# Patient Record
Sex: FEMALE | Race: WHITE | Employment: OTHER | URBAN - NONMETROPOLITAN AREA
[De-identification: names, ages, dates, MRNs, and addresses within clinical notes are randomized per-mention and may not be internally consistent; named-entity substitution may affect disease eponyms.]

---

## 2017-02-20 ENCOUNTER — OFFICE VISIT (OUTPATIENT)
Dept: FAMILY MEDICINE CLINIC | Age: 62
End: 2017-02-20

## 2017-02-20 VITALS
BODY MASS INDEX: 30.67 KG/M2 | WEIGHT: 195.4 LBS | HEART RATE: 72 BPM | DIASTOLIC BLOOD PRESSURE: 80 MMHG | SYSTOLIC BLOOD PRESSURE: 138 MMHG | HEIGHT: 67 IN

## 2017-02-20 DIAGNOSIS — E78.1 PURE HYPERGLYCERIDEMIA: Primary | Chronic | ICD-10-CM

## 2017-02-20 DIAGNOSIS — K21.9 GASTROESOPHAGEAL REFLUX DISEASE WITHOUT ESOPHAGITIS: ICD-10-CM

## 2017-02-20 PROCEDURE — G8428 CUR MEDS NOT DOCUMENT: HCPCS | Performed by: FAMILY MEDICINE

## 2017-02-20 PROCEDURE — 99213 OFFICE O/P EST LOW 20 MIN: CPT | Performed by: FAMILY MEDICINE

## 2017-02-20 PROCEDURE — G8484 FLU IMMUNIZE NO ADMIN: HCPCS | Performed by: FAMILY MEDICINE

## 2017-02-20 PROCEDURE — 3014F SCREEN MAMMO DOC REV: CPT | Performed by: FAMILY MEDICINE

## 2017-02-20 PROCEDURE — 3017F COLORECTAL CA SCREEN DOC REV: CPT | Performed by: FAMILY MEDICINE

## 2017-02-20 PROCEDURE — G8417 CALC BMI ABV UP PARAM F/U: HCPCS | Performed by: FAMILY MEDICINE

## 2017-02-20 PROCEDURE — 1036F TOBACCO NON-USER: CPT | Performed by: FAMILY MEDICINE

## 2017-02-20 RX ORDER — PANTOPRAZOLE SODIUM 40 MG/1
40 TABLET, DELAYED RELEASE ORAL DAILY
Qty: 90 TABLET | Refills: 1 | Status: SHIPPED | OUTPATIENT
Start: 2017-02-20 | End: 2017-08-21 | Stop reason: SDUPTHER

## 2017-02-20 RX ORDER — FENOFIBRATE 145 MG/1
145 TABLET, COATED ORAL DAILY
Qty: 90 TABLET | Refills: 1 | Status: SHIPPED | OUTPATIENT
Start: 2017-02-20 | End: 2017-08-21 | Stop reason: SDUPTHER

## 2017-02-20 RX ORDER — B-COMPLEX WITH VITAMIN C
1 TABLET ORAL DAILY
COMMUNITY
End: 2018-02-19 | Stop reason: DRUGHIGH

## 2017-02-20 RX ORDER — SULFAMETHOXAZOLE AND TRIMETHOPRIM 800; 160 MG/1; MG/1
1 TABLET ORAL 2 TIMES DAILY
Qty: 10 TABLET | Refills: 0 | Status: SHIPPED | OUTPATIENT
Start: 2017-02-20 | End: 2017-02-25

## 2017-02-20 ASSESSMENT — ENCOUNTER SYMPTOMS
RESPIRATORY NEGATIVE: 1
GASTROINTESTINAL NEGATIVE: 1
SHORTNESS OF BREATH: 0

## 2017-04-06 ENCOUNTER — TELEPHONE (OUTPATIENT)
Dept: FAMILY MEDICINE CLINIC | Age: 62
End: 2017-04-06

## 2017-04-06 DIAGNOSIS — R10.11 RIGHT UPPER QUADRANT ABDOMINAL PAIN: Primary | ICD-10-CM

## 2017-04-07 ENCOUNTER — TELEPHONE (OUTPATIENT)
Dept: FAMILY MEDICINE CLINIC | Age: 62
End: 2017-04-07

## 2017-04-10 ENCOUNTER — TELEPHONE (OUTPATIENT)
Dept: FAMILY MEDICINE CLINIC | Age: 62
End: 2017-04-10

## 2017-04-10 DIAGNOSIS — K80.20 GALLSTONES: Primary | ICD-10-CM

## 2017-04-20 ENCOUNTER — TELEPHONE (OUTPATIENT)
Age: 62
End: 2017-04-20

## 2017-04-20 ENCOUNTER — OFFICE VISIT (OUTPATIENT)
Age: 62
End: 2017-04-20

## 2017-04-20 VITALS
HEART RATE: 71 BPM | DIASTOLIC BLOOD PRESSURE: 78 MMHG | SYSTOLIC BLOOD PRESSURE: 132 MMHG | RESPIRATION RATE: 16 BRPM | TEMPERATURE: 99.4 F | BODY MASS INDEX: 28.34 KG/M2 | WEIGHT: 187 LBS | HEIGHT: 68 IN

## 2017-04-20 DIAGNOSIS — Z01.818 PRE-OP TESTING: ICD-10-CM

## 2017-04-20 DIAGNOSIS — K80.20 GALLSTONES: Primary | ICD-10-CM

## 2017-04-20 PROCEDURE — 1036F TOBACCO NON-USER: CPT | Performed by: SURGERY

## 2017-04-20 PROCEDURE — G8427 DOCREV CUR MEDS BY ELIG CLIN: HCPCS | Performed by: SURGERY

## 2017-04-20 PROCEDURE — 3014F SCREEN MAMMO DOC REV: CPT | Performed by: SURGERY

## 2017-04-20 PROCEDURE — 99204 OFFICE O/P NEW MOD 45 MIN: CPT | Performed by: SURGERY

## 2017-04-20 PROCEDURE — G8417 CALC BMI ABV UP PARAM F/U: HCPCS | Performed by: SURGERY

## 2017-04-20 PROCEDURE — 3017F COLORECTAL CA SCREEN DOC REV: CPT | Performed by: SURGERY

## 2017-04-20 ASSESSMENT — ENCOUNTER SYMPTOMS
ABDOMINAL PAIN: 1
ABDOMINAL DISTENTION: 0
CONSTIPATION: 1
CHEST TIGHTNESS: 0
APNEA: 0
ANAL BLEEDING: 0
BLOOD IN STOOL: 0
EYE DISCHARGE: 0
COLOR CHANGE: 0
WHEEZING: 0
COUGH: 1
RHINORRHEA: 0
CHOKING: 0
EYE PAIN: 0
SORE THROAT: 0
SINUS PRESSURE: 0
STRIDOR: 0
BACK PAIN: 1
EYE ITCHING: 0
NAUSEA: 0
DIARRHEA: 0
PHOTOPHOBIA: 0
RECTAL PAIN: 0
FACIAL SWELLING: 0
VOICE CHANGE: 0
TROUBLE SWALLOWING: 0
SHORTNESS OF BREATH: 0
VOMITING: 0
EYE REDNESS: 0

## 2017-05-15 ENCOUNTER — TELEPHONE (OUTPATIENT)
Age: 62
End: 2017-05-15

## 2017-05-23 ENCOUNTER — OFFICE VISIT (OUTPATIENT)
Age: 62
End: 2017-05-23

## 2017-05-23 VITALS
DIASTOLIC BLOOD PRESSURE: 72 MMHG | HEIGHT: 67 IN | SYSTOLIC BLOOD PRESSURE: 122 MMHG | TEMPERATURE: 97.6 F | HEART RATE: 58 BPM | WEIGHT: 181.1 LBS | OXYGEN SATURATION: 96 % | RESPIRATION RATE: 18 BRPM | BODY MASS INDEX: 28.43 KG/M2

## 2017-05-23 DIAGNOSIS — Z90.49 S/P LAPAROSCOPIC CHOLECYSTECTOMY: Primary | ICD-10-CM

## 2017-05-23 PROCEDURE — 99024 POSTOP FOLLOW-UP VISIT: CPT | Performed by: NURSE PRACTITIONER

## 2017-05-23 ASSESSMENT — ENCOUNTER SYMPTOMS
ABDOMINAL DISTENTION: 0
EYE ITCHING: 0
SHORTNESS OF BREATH: 0
BACK PAIN: 0
TROUBLE SWALLOWING: 0
EYE PAIN: 0
RHINORRHEA: 0
CHEST TIGHTNESS: 0
COUGH: 0
EYE DISCHARGE: 0
ABDOMINAL PAIN: 0
BLOOD IN STOOL: 0
NAUSEA: 0
APNEA: 0
RECTAL PAIN: 0
PHOTOPHOBIA: 0
COLOR CHANGE: 0
STRIDOR: 0
DIARRHEA: 0
CONSTIPATION: 0
EYE REDNESS: 0
FACIAL SWELLING: 0
CHOKING: 0
ANAL BLEEDING: 0
SINUS PRESSURE: 0
WHEEZING: 0
VOMITING: 0
SORE THROAT: 0
VOICE CHANGE: 0

## 2017-08-04 ENCOUNTER — OFFICE VISIT (OUTPATIENT)
Dept: FAMILY MEDICINE CLINIC | Age: 62
End: 2017-08-04
Payer: COMMERCIAL

## 2017-08-04 VITALS
SYSTOLIC BLOOD PRESSURE: 122 MMHG | DIASTOLIC BLOOD PRESSURE: 80 MMHG | HEIGHT: 70 IN | BODY MASS INDEX: 25.62 KG/M2 | HEART RATE: 68 BPM | WEIGHT: 179 LBS

## 2017-08-04 DIAGNOSIS — R35.0 URINARY FREQUENCY: ICD-10-CM

## 2017-08-04 DIAGNOSIS — N30.01 ACUTE CYSTITIS WITH HEMATURIA: Primary | ICD-10-CM

## 2017-08-04 LAB
BILIRUBIN, POC: NEGATIVE
BLOOD URINE, POC: NORMAL
CLARITY, POC: NORMAL
COLOR, POC: YELLOW
GLUCOSE URINE, POC: NEGATIVE
KETONES, POC: NEGATIVE
LEUKOCYTE EST, POC: NORMAL
NITRITE, POC: NEGATIVE
PH, POC: 6
PROTEIN, POC: 30
SPECIFIC GRAVITY, POC: 1.02
UROBILINOGEN, POC: 0.2

## 2017-08-04 PROCEDURE — 99213 OFFICE O/P EST LOW 20 MIN: CPT | Performed by: FAMILY MEDICINE

## 2017-08-04 PROCEDURE — 81002 URINALYSIS NONAUTO W/O SCOPE: CPT | Performed by: FAMILY MEDICINE

## 2017-08-04 PROCEDURE — 3014F SCREEN MAMMO DOC REV: CPT | Performed by: FAMILY MEDICINE

## 2017-08-04 PROCEDURE — G8417 CALC BMI ABV UP PARAM F/U: HCPCS | Performed by: FAMILY MEDICINE

## 2017-08-04 PROCEDURE — 3017F COLORECTAL CA SCREEN DOC REV: CPT | Performed by: FAMILY MEDICINE

## 2017-08-04 PROCEDURE — 1036F TOBACCO NON-USER: CPT | Performed by: FAMILY MEDICINE

## 2017-08-04 PROCEDURE — G8427 DOCREV CUR MEDS BY ELIG CLIN: HCPCS | Performed by: FAMILY MEDICINE

## 2017-08-04 RX ORDER — NITROFURANTOIN 25; 75 MG/1; MG/1
100 CAPSULE ORAL 2 TIMES DAILY
Qty: 10 CAPSULE | Refills: 0 | Status: SHIPPED | OUTPATIENT
Start: 2017-08-04 | End: 2017-08-09

## 2017-08-04 ASSESSMENT — ENCOUNTER SYMPTOMS
VOMITING: 0
NAUSEA: 0
CONSTIPATION: 1
DIARRHEA: 0

## 2017-08-21 ENCOUNTER — OFFICE VISIT (OUTPATIENT)
Dept: FAMILY MEDICINE CLINIC | Age: 62
End: 2017-08-21
Payer: COMMERCIAL

## 2017-08-21 VITALS
DIASTOLIC BLOOD PRESSURE: 80 MMHG | BODY MASS INDEX: 26.14 KG/M2 | HEIGHT: 70 IN | WEIGHT: 182.6 LBS | HEART RATE: 64 BPM | SYSTOLIC BLOOD PRESSURE: 124 MMHG

## 2017-08-21 DIAGNOSIS — K21.9 GASTROESOPHAGEAL REFLUX DISEASE WITHOUT ESOPHAGITIS: ICD-10-CM

## 2017-08-21 DIAGNOSIS — E78.1 PURE HYPERGLYCERIDEMIA: Primary | Chronic | ICD-10-CM

## 2017-08-21 PROCEDURE — 3014F SCREEN MAMMO DOC REV: CPT | Performed by: FAMILY MEDICINE

## 2017-08-21 PROCEDURE — 1036F TOBACCO NON-USER: CPT | Performed by: FAMILY MEDICINE

## 2017-08-21 PROCEDURE — 3017F COLORECTAL CA SCREEN DOC REV: CPT | Performed by: FAMILY MEDICINE

## 2017-08-21 PROCEDURE — 99213 OFFICE O/P EST LOW 20 MIN: CPT | Performed by: FAMILY MEDICINE

## 2017-08-21 PROCEDURE — G8427 DOCREV CUR MEDS BY ELIG CLIN: HCPCS | Performed by: FAMILY MEDICINE

## 2017-08-21 PROCEDURE — G8417 CALC BMI ABV UP PARAM F/U: HCPCS | Performed by: FAMILY MEDICINE

## 2017-08-21 RX ORDER — PANTOPRAZOLE SODIUM 40 MG/1
40 TABLET, DELAYED RELEASE ORAL DAILY
Qty: 90 TABLET | Refills: 1 | Status: SHIPPED | OUTPATIENT
Start: 2017-08-21 | End: 2018-02-19 | Stop reason: SDUPTHER

## 2017-08-21 RX ORDER — FENOFIBRATE 145 MG/1
145 TABLET, COATED ORAL DAILY
Qty: 90 TABLET | Refills: 1 | Status: SHIPPED | OUTPATIENT
Start: 2017-08-21 | End: 2018-02-19 | Stop reason: SDUPTHER

## 2017-08-21 RX ORDER — ESTRADIOL 0.1 MG/G
1 CREAM VAGINAL DAILY
Qty: 1 TUBE | Refills: 3 | Status: SHIPPED | OUTPATIENT
Start: 2017-08-21 | End: 2019-02-18

## 2017-08-21 ASSESSMENT — PATIENT HEALTH QUESTIONNAIRE - PHQ9
1. LITTLE INTEREST OR PLEASURE IN DOING THINGS: 0
SUM OF ALL RESPONSES TO PHQ QUESTIONS 1-9: 0
SUM OF ALL RESPONSES TO PHQ9 QUESTIONS 1 & 2: 0
2. FEELING DOWN, DEPRESSED OR HOPELESS: 0

## 2017-08-21 ASSESSMENT — ENCOUNTER SYMPTOMS
SHORTNESS OF BREATH: 0
GASTROINTESTINAL NEGATIVE: 1
RESPIRATORY NEGATIVE: 1

## 2017-11-03 ENCOUNTER — NURSE TRIAGE (OUTPATIENT)
Dept: ADMINISTRATIVE | Age: 62
End: 2017-11-03

## 2017-11-07 ENCOUNTER — OFFICE VISIT (OUTPATIENT)
Dept: FAMILY MEDICINE CLINIC | Age: 62
End: 2017-11-07
Payer: COMMERCIAL

## 2017-11-07 VITALS
HEIGHT: 70 IN | WEIGHT: 187.2 LBS | DIASTOLIC BLOOD PRESSURE: 78 MMHG | BODY MASS INDEX: 26.8 KG/M2 | SYSTOLIC BLOOD PRESSURE: 136 MMHG | HEART RATE: 80 BPM

## 2017-11-07 DIAGNOSIS — N30.00 ACUTE CYSTITIS WITHOUT HEMATURIA: Primary | ICD-10-CM

## 2017-11-07 LAB
BACTERIA URINE, POC: NORMAL
BILIRUBIN URINE: 0 MG/DL
BLOOD, URINE: POSITIVE
CASTS URINE, POC: NORMAL
CLARITY: CLEAR
COLOR: NORMAL
CRYSTALS URINE, POC: NORMAL
EPI CELLS URINE, POC: NORMAL
GLUCOSE URINE: NORMAL
KETONES, URINE: NEGATIVE
LEUKOCYTE EST, POC: NORMAL
NITRITE, URINE: NEGATIVE
PH UA: 7 (ref 4.5–8)
PROTEIN UA: NEGATIVE
RBC URINE, POC: NORMAL
SPECIFIC GRAVITY UA: 1.02 (ref 1–1.03)
UROBILINOGEN, URINE: NORMAL
WBC URINE, POC: NORMAL
YEAST URINE, POC: NORMAL

## 2017-11-07 PROCEDURE — G8484 FLU IMMUNIZE NO ADMIN: HCPCS | Performed by: FAMILY MEDICINE

## 2017-11-07 PROCEDURE — 81000 URINALYSIS NONAUTO W/SCOPE: CPT | Performed by: FAMILY MEDICINE

## 2017-11-07 PROCEDURE — 3014F SCREEN MAMMO DOC REV: CPT | Performed by: FAMILY MEDICINE

## 2017-11-07 PROCEDURE — 3017F COLORECTAL CA SCREEN DOC REV: CPT | Performed by: FAMILY MEDICINE

## 2017-11-07 PROCEDURE — 1036F TOBACCO NON-USER: CPT | Performed by: FAMILY MEDICINE

## 2017-11-07 PROCEDURE — G8427 DOCREV CUR MEDS BY ELIG CLIN: HCPCS | Performed by: FAMILY MEDICINE

## 2017-11-07 PROCEDURE — G8417 CALC BMI ABV UP PARAM F/U: HCPCS | Performed by: FAMILY MEDICINE

## 2017-11-07 PROCEDURE — 99213 OFFICE O/P EST LOW 20 MIN: CPT | Performed by: FAMILY MEDICINE

## 2017-11-07 RX ORDER — SULFAMETHOXAZOLE AND TRIMETHOPRIM 800; 160 MG/1; MG/1
TABLET ORAL
Qty: 60 TABLET | Refills: 0 | Status: SHIPPED | OUTPATIENT
Start: 2017-11-07 | End: 2018-03-09 | Stop reason: ALTCHOICE

## 2017-11-07 NOTE — PROGRESS NOTES
Name:  Edmund Ricci  :    1955    Chief Complaint   Patient presents with    Other     urinary frequency-better this week       HPI:  Long history of bladder issues. Previous repairs and YUKI. Some UTI's. Now with one week of frequency and nocturia. No hematuria. No fever. Gino Mccallum seems to work last visit. Recent lab tests were great. Physical Exam:      /78 (Site: Left Arm, Position: Sitting, Cuff Size: Large Adult)   Pulse 80   Ht 5' 10\" (1.778 m)   Wt 187 lb 3.2 oz (84.9 kg)   BMI 26.86 kg/m²     Not ill. Abd is soft and non tender. Pelvic not indicated. Urine positive for blood and WBC's    Assessment/Plan:      Recurrent UTI    Try suppressive Bactrim    Maykel Vald[place] was seen today for other.     Diagnoses and all orders for this visit:    Acute cystitis without hematuria  -     POC URINE with Microscopic    Other orders  -     sulfamethoxazole-trimethoprim (BACTRIM DS) 800-160 MG per tablet; BID for 7 days then 1/2 tab nightly

## 2017-11-17 ENCOUNTER — OFFICE VISIT (OUTPATIENT)
Dept: FAMILY MEDICINE CLINIC | Age: 62
End: 2017-11-17
Payer: COMMERCIAL

## 2017-11-17 VITALS
DIASTOLIC BLOOD PRESSURE: 60 MMHG | SYSTOLIC BLOOD PRESSURE: 122 MMHG | BODY MASS INDEX: 26.6 KG/M2 | HEIGHT: 70 IN | TEMPERATURE: 98.2 F | HEART RATE: 60 BPM | WEIGHT: 185.8 LBS

## 2017-11-17 DIAGNOSIS — H61.21 IMPACTED CERUMEN OF RIGHT EAR: Primary | ICD-10-CM

## 2017-11-17 PROCEDURE — G8427 DOCREV CUR MEDS BY ELIG CLIN: HCPCS | Performed by: FAMILY MEDICINE

## 2017-11-17 PROCEDURE — 3014F SCREEN MAMMO DOC REV: CPT | Performed by: FAMILY MEDICINE

## 2017-11-17 PROCEDURE — 3017F COLORECTAL CA SCREEN DOC REV: CPT | Performed by: FAMILY MEDICINE

## 2017-11-17 PROCEDURE — G8484 FLU IMMUNIZE NO ADMIN: HCPCS | Performed by: FAMILY MEDICINE

## 2017-11-17 PROCEDURE — 99212 OFFICE O/P EST SF 10 MIN: CPT | Performed by: FAMILY MEDICINE

## 2017-11-17 PROCEDURE — 1036F TOBACCO NON-USER: CPT | Performed by: FAMILY MEDICINE

## 2017-11-17 PROCEDURE — G8417 CALC BMI ABV UP PARAM F/U: HCPCS | Performed by: FAMILY MEDICINE

## 2017-11-17 RX ORDER — HYDROCORTISONE AND ACETIC ACID 20.75; 10.375 MG/ML; MG/ML
3 SOLUTION AURICULAR (OTIC) 2 TIMES DAILY
Qty: 1 BOTTLE | Refills: 2 | Status: SHIPPED | OUTPATIENT
Start: 2017-11-17 | End: 2017-11-27

## 2017-11-17 RX ORDER — MULTIVIT-MIN/IRON/FOLIC ACID/K 18-600-40
1 CAPSULE ORAL DAILY
COMMUNITY

## 2018-02-19 ENCOUNTER — OFFICE VISIT (OUTPATIENT)
Dept: FAMILY MEDICINE CLINIC | Age: 63
End: 2018-02-19
Payer: COMMERCIAL

## 2018-02-19 VITALS
WEIGHT: 193.4 LBS | HEIGHT: 70 IN | DIASTOLIC BLOOD PRESSURE: 82 MMHG | BODY MASS INDEX: 27.69 KG/M2 | SYSTOLIC BLOOD PRESSURE: 130 MMHG | HEART RATE: 68 BPM

## 2018-02-19 DIAGNOSIS — E78.1 PURE HYPERGLYCERIDEMIA: Primary | Chronic | ICD-10-CM

## 2018-02-19 DIAGNOSIS — L63.9 ALOPECIA AREATA: Chronic | ICD-10-CM

## 2018-02-19 DIAGNOSIS — K21.9 GASTROESOPHAGEAL REFLUX DISEASE WITHOUT ESOPHAGITIS: ICD-10-CM

## 2018-02-19 PROCEDURE — 3014F SCREEN MAMMO DOC REV: CPT | Performed by: FAMILY MEDICINE

## 2018-02-19 PROCEDURE — 1036F TOBACCO NON-USER: CPT | Performed by: FAMILY MEDICINE

## 2018-02-19 PROCEDURE — G8417 CALC BMI ABV UP PARAM F/U: HCPCS | Performed by: FAMILY MEDICINE

## 2018-02-19 PROCEDURE — 99213 OFFICE O/P EST LOW 20 MIN: CPT | Performed by: FAMILY MEDICINE

## 2018-02-19 PROCEDURE — G8484 FLU IMMUNIZE NO ADMIN: HCPCS | Performed by: FAMILY MEDICINE

## 2018-02-19 PROCEDURE — G8427 DOCREV CUR MEDS BY ELIG CLIN: HCPCS | Performed by: FAMILY MEDICINE

## 2018-02-19 PROCEDURE — 3017F COLORECTAL CA SCREEN DOC REV: CPT | Performed by: FAMILY MEDICINE

## 2018-02-19 RX ORDER — ZINC GLUCONATE 50 MG
50 TABLET ORAL DAILY
COMMUNITY

## 2018-02-19 RX ORDER — LANOLIN ALCOHOL/MO/W.PET/CERES
1000 CREAM (GRAM) TOPICAL DAILY
COMMUNITY

## 2018-02-19 RX ORDER — DOCUSATE SODIUM 100 MG/1
250 CAPSULE, LIQUID FILLED ORAL DAILY
COMMUNITY
End: 2020-02-25

## 2018-02-19 RX ORDER — PANTOPRAZOLE SODIUM 40 MG/1
40 TABLET, DELAYED RELEASE ORAL DAILY
Qty: 90 TABLET | Refills: 1 | Status: SHIPPED | OUTPATIENT
Start: 2018-02-19 | End: 2018-08-20 | Stop reason: SDUPTHER

## 2018-02-19 RX ORDER — FENOFIBRATE 145 MG/1
145 TABLET, COATED ORAL DAILY
Qty: 90 TABLET | Refills: 1 | Status: SHIPPED | OUTPATIENT
Start: 2018-02-19 | End: 2018-08-20 | Stop reason: SDUPTHER

## 2018-02-19 ASSESSMENT — ENCOUNTER SYMPTOMS
SHORTNESS OF BREATH: 0
GASTROINTESTINAL NEGATIVE: 1
RESPIRATORY NEGATIVE: 1

## 2018-02-19 NOTE — PROGRESS NOTES
pantoprazole (PROTONIX) 40 MG tablet       Plan:      Return in about 6 months (around 8/19/2018). Orders Placed:  No orders of the defined types were placed in this encounter.     Medications Prescribed:  Orders Placed This Encounter   Medications    fenofibrate (TRICOR) 145 MG tablet     Sig: Take 1 tablet by mouth daily     Dispense:  90 tablet     Refill:  1    pantoprazole (PROTONIX) 40 MG tablet     Sig: Take 1 tablet by mouth daily     Dispense:  90 tablet     Refill:  1         Electronically signed by Racquel Mora MD on 2/19/2018 at 12:55 PM

## 2018-03-09 ENCOUNTER — OFFICE VISIT (OUTPATIENT)
Dept: FAMILY MEDICINE CLINIC | Age: 63
End: 2018-03-09
Payer: COMMERCIAL

## 2018-03-09 VITALS
WEIGHT: 192.8 LBS | HEIGHT: 67 IN | TEMPERATURE: 98.7 F | HEART RATE: 68 BPM | SYSTOLIC BLOOD PRESSURE: 128 MMHG | BODY MASS INDEX: 30.26 KG/M2 | DIASTOLIC BLOOD PRESSURE: 68 MMHG

## 2018-03-09 DIAGNOSIS — J01.90 ACUTE SINUSITIS, RECURRENCE NOT SPECIFIED, UNSPECIFIED LOCATION: Primary | ICD-10-CM

## 2018-03-09 PROCEDURE — 1036F TOBACCO NON-USER: CPT | Performed by: FAMILY MEDICINE

## 2018-03-09 PROCEDURE — 3014F SCREEN MAMMO DOC REV: CPT | Performed by: FAMILY MEDICINE

## 2018-03-09 PROCEDURE — 3017F COLORECTAL CA SCREEN DOC REV: CPT | Performed by: FAMILY MEDICINE

## 2018-03-09 PROCEDURE — 99212 OFFICE O/P EST SF 10 MIN: CPT | Performed by: FAMILY MEDICINE

## 2018-03-09 PROCEDURE — G8417 CALC BMI ABV UP PARAM F/U: HCPCS | Performed by: FAMILY MEDICINE

## 2018-03-09 PROCEDURE — G8428 CUR MEDS NOT DOCUMENT: HCPCS | Performed by: FAMILY MEDICINE

## 2018-03-09 PROCEDURE — G8484 FLU IMMUNIZE NO ADMIN: HCPCS | Performed by: FAMILY MEDICINE

## 2018-03-09 RX ORDER — AMOXICILLIN 500 MG/1
500 CAPSULE ORAL 2 TIMES DAILY
Qty: 20 CAPSULE | Refills: 0 | Status: SHIPPED | OUTPATIENT
Start: 2018-03-09 | End: 2018-03-19

## 2018-03-09 NOTE — PROGRESS NOTES
Name:  Aury Bhatt  :    1955    Chief Complaint   Patient presents with    Cough     4 days       HPI:  See recent note. Has at risk grandson with cleft palate. Now she has 3-4 days of progressive congestion and cough. Maybe low grade fever. No SOB. No sore throat. Physical Exam:      /68 (Site: Right Arm, Position: Sitting, Cuff Size: Large Adult)   Pulse 68   Temp 98.7 °F (37.1 °C) (Oral)   Ht 5' 7.25\" (1.708 m)   Wt 192 lb 12.8 oz (87.5 kg)   BMI 29.97 kg/m²     Not ill. Severe congested cough. ENT:   TM's clear. Phar is red. No nodes. Lungs are clear. Heart in RRR with no murmur. Assessment/Plan:      Sinusitis  Amoxil. Halina Snow was seen today for cough. Diagnoses and all orders for this visit:    Acute sinusitis, recurrence not specified, unspecified location  -     amoxicillin (AMOXIL) 500 MG capsule;  Take 1 capsule by mouth 2 times daily for 10 days

## 2018-05-12 LAB
ALBUMIN SERPL-MCNC: 4.3 G/DL
ALP BLD-CCNC: 36 U/L
ALT SERPL-CCNC: 15 U/L
ANION GAP SERPL CALCULATED.3IONS-SCNC: NORMAL MMOL/L
AST SERPL-CCNC: 20 U/L
BASOPHILS ABSOLUTE: 0.1 /ΜL
BASOPHILS RELATIVE PERCENT: 1.1 %
BILIRUB SERPL-MCNC: 0.4 MG/DL (ref 0.1–1.4)
BUN BLDV-MCNC: 17 MG/DL
CALCIUM SERPL-MCNC: 9.1 MG/DL
CHLORIDE BLD-SCNC: 104 MMOL/L
CHOLESTEROL, TOTAL: 167 MG/DL
CHOLESTEROL/HDL RATIO: NORMAL
CO2: 30 MMOL/L
CREAT SERPL-MCNC: 0.8 MG/DL
EOSINOPHILS ABSOLUTE: 0.2 /ΜL
EOSINOPHILS RELATIVE PERCENT: 3.7 %
GFR CALCULATED: 72
GLUCOSE BLD-MCNC: 79 MG/DL
HCT VFR BLD CALC: 36.9 % (ref 36–46)
HDLC SERPL-MCNC: 53 MG/DL (ref 35–70)
HEMOGLOBIN: 12.4 G/DL (ref 12–16)
LDL CHOLESTEROL CALCULATED: 89 MG/DL (ref 0–160)
LYMPHOCYTES ABSOLUTE: 1.9 /ΜL
LYMPHOCYTES RELATIVE PERCENT: 39.6 %
MCH RBC QN AUTO: 31.2 PG
MCHC RBC AUTO-ENTMCNC: 33.5 G/DL
MCV RBC AUTO: 93 FL
MONOCYTES ABSOLUTE: 0.4 /ΜL
MONOCYTES RELATIVE PERCENT: 7.7 %
NEUTROPHILS ABSOLUTE: 2.3 /ΜL
NEUTROPHILS RELATIVE PERCENT: 47.9 %
PDW BLD-RTO: 12.8 %
PLATELET # BLD: 278 K/ΜL
PMV BLD AUTO: 9.2 FL
POTASSIUM SERPL-SCNC: 3.9 MMOL/L
RBC # BLD: 3.96 10^6/ΜL
SODIUM BLD-SCNC: 141 MMOL/L
TOTAL PROTEIN: 6.6
TRIGL SERPL-MCNC: 127 MG/DL
VLDLC SERPL CALC-MCNC: 25 MG/DL
WBC # BLD: 4.8 10^3/ML

## 2018-08-20 ENCOUNTER — OFFICE VISIT (OUTPATIENT)
Dept: FAMILY MEDICINE CLINIC | Age: 63
End: 2018-08-20
Payer: COMMERCIAL

## 2018-08-20 ENCOUNTER — HOSPITAL ENCOUNTER (OUTPATIENT)
Dept: MAMMOGRAPHY | Age: 63
Discharge: HOME | End: 2018-08-20
Payer: COMMERCIAL

## 2018-08-20 VITALS
HEART RATE: 60 BPM | BODY MASS INDEX: 30.35 KG/M2 | DIASTOLIC BLOOD PRESSURE: 82 MMHG | HEIGHT: 67 IN | WEIGHT: 193.4 LBS | SYSTOLIC BLOOD PRESSURE: 134 MMHG

## 2018-08-20 DIAGNOSIS — K21.9 GASTROESOPHAGEAL REFLUX DISEASE WITHOUT ESOPHAGITIS: ICD-10-CM

## 2018-08-20 DIAGNOSIS — Z12.31 VISIT FOR SCREENING MAMMOGRAM: ICD-10-CM

## 2018-08-20 DIAGNOSIS — E78.1 PURE HYPERGLYCERIDEMIA: Chronic | ICD-10-CM

## 2018-08-20 PROCEDURE — 77067 SCR MAMMO BI INCL CAD: CPT

## 2018-08-20 PROCEDURE — G8427 DOCREV CUR MEDS BY ELIG CLIN: HCPCS | Performed by: FAMILY MEDICINE

## 2018-08-20 PROCEDURE — 3017F COLORECTAL CA SCREEN DOC REV: CPT | Performed by: FAMILY MEDICINE

## 2018-08-20 PROCEDURE — 1036F TOBACCO NON-USER: CPT | Performed by: FAMILY MEDICINE

## 2018-08-20 PROCEDURE — G8417 CALC BMI ABV UP PARAM F/U: HCPCS | Performed by: FAMILY MEDICINE

## 2018-08-20 PROCEDURE — 99213 OFFICE O/P EST LOW 20 MIN: CPT | Performed by: FAMILY MEDICINE

## 2018-08-20 RX ORDER — PANTOPRAZOLE SODIUM 40 MG/1
40 TABLET, DELAYED RELEASE ORAL DAILY
Qty: 90 TABLET | Refills: 1 | Status: SHIPPED | OUTPATIENT
Start: 2018-08-20 | End: 2019-02-18 | Stop reason: SDUPTHER

## 2018-08-20 RX ORDER — FENOFIBRATE 145 MG/1
145 TABLET, COATED ORAL DAILY
Qty: 90 TABLET | Refills: 1 | Status: SHIPPED | OUTPATIENT
Start: 2018-08-20 | End: 2019-02-18 | Stop reason: SDUPTHER

## 2018-08-20 ASSESSMENT — ENCOUNTER SYMPTOMS
SHORTNESS OF BREATH: 0
GASTROINTESTINAL NEGATIVE: 1
RESPIRATORY NEGATIVE: 1

## 2018-08-20 NOTE — PROGRESS NOTES
Known Allergies. Subjective:      Review of Systems   Constitutional: Negative. Negative for activity change, appetite change and unexpected weight change. HENT: Negative. Respiratory: Negative. Negative for shortness of breath. Cardiovascular: Negative. Negative for chest pain. Gastrointestinal: Negative. Genitourinary: Negative. Musculoskeletal: Negative. Skin: Negative. Neurological: Negative. Hematological: Negative. Psychiatric/Behavioral: Negative. Negative for dysphoric mood. Objective:     /82 (Site: Left Arm, Position: Sitting, Cuff Size: Large Adult)   Pulse 60   Ht 5' 7.25\" (1.708 m)   Wt 193 lb 6.4 oz (87.7 kg)   BMI 30.07 kg/m²     Physical Exam   Constitutional: She is oriented to person, place, and time. She appears well-developed and well-nourished. Neck: Normal range of motion. Neck supple. No thyromegaly present. Cardiovascular: Normal rate, regular rhythm, normal heart sounds and intact distal pulses. Exam reveals no gallop and no friction rub. No murmur heard. Pulmonary/Chest: Effort normal and breath sounds normal.   Abdominal: Soft. She exhibits no mass. There is no splenomegaly or hepatomegaly. There is no tenderness. Musculoskeletal: She exhibits no edema or tenderness. Lymphadenopathy:     She has no cervical adenopathy. Neurological: She is alert and oriented to person, place, and time. Ambulatory. No tremors. Skin: Skin is warm and dry. No rash noted. Psychiatric: She has a normal mood and affect. Vitals reviewed. Assessment:       Diagnosis Orders   1. Pure hyperglyceridemia  fenofibrate (TRICOR) 145 MG tablet   2. Gastroesophageal reflux disease without esophagitis  pantoprazole (PROTONIX) 40 MG tablet       Plan:      Return if symptoms worsen or fail to improve, for CHOL, check-up. Orders Placed:  No orders of the defined types were placed in this encounter.     Medications Prescribed:  Orders Placed

## 2018-10-02 ENCOUNTER — OFFICE VISIT (OUTPATIENT)
Dept: FAMILY MEDICINE CLINIC | Age: 63
End: 2018-10-02
Payer: COMMERCIAL

## 2018-10-02 VITALS
DIASTOLIC BLOOD PRESSURE: 80 MMHG | HEART RATE: 76 BPM | SYSTOLIC BLOOD PRESSURE: 130 MMHG | BODY MASS INDEX: 30.45 KG/M2 | HEIGHT: 67 IN | WEIGHT: 194 LBS

## 2018-10-02 DIAGNOSIS — N30.90 CYSTITIS: Primary | ICD-10-CM

## 2018-10-02 LAB
BILIRUBIN, POC: NORMAL
BLOOD URINE, POC: NORMAL
CLARITY, POC: CLEAR
COLOR, POC: YELLOW
GLUCOSE URINE, POC: NORMAL
KETONES, POC: NORMAL
LEUKOCYTE EST, POC: NORMAL
NITRITE, POC: NORMAL
PH, POC: 7
PROTEIN, POC: NORMAL
SPECIFIC GRAVITY, POC: 1.02
UROBILINOGEN, POC: 0.2

## 2018-10-02 PROCEDURE — G8427 DOCREV CUR MEDS BY ELIG CLIN: HCPCS | Performed by: FAMILY MEDICINE

## 2018-10-02 PROCEDURE — 3017F COLORECTAL CA SCREEN DOC REV: CPT | Performed by: FAMILY MEDICINE

## 2018-10-02 PROCEDURE — G8484 FLU IMMUNIZE NO ADMIN: HCPCS | Performed by: FAMILY MEDICINE

## 2018-10-02 PROCEDURE — 1036F TOBACCO NON-USER: CPT | Performed by: FAMILY MEDICINE

## 2018-10-02 PROCEDURE — 81002 URINALYSIS NONAUTO W/O SCOPE: CPT | Performed by: FAMILY MEDICINE

## 2018-10-02 PROCEDURE — 99212 OFFICE O/P EST SF 10 MIN: CPT | Performed by: FAMILY MEDICINE

## 2018-10-02 PROCEDURE — G8417 CALC BMI ABV UP PARAM F/U: HCPCS | Performed by: FAMILY MEDICINE

## 2018-10-02 RX ORDER — SULFAMETHOXAZOLE AND TRIMETHOPRIM 800; 160 MG/1; MG/1
TABLET ORAL
Qty: 60 TABLET | Refills: 0 | Status: SHIPPED | OUTPATIENT
Start: 2018-10-02 | End: 2019-01-07 | Stop reason: SDUPTHER

## 2018-10-02 ASSESSMENT — PATIENT HEALTH QUESTIONNAIRE - PHQ9
SUM OF ALL RESPONSES TO PHQ9 QUESTIONS 1 & 2: 0
SUM OF ALL RESPONSES TO PHQ QUESTIONS 1-9: 0
SUM OF ALL RESPONSES TO PHQ QUESTIONS 1-9: 0
2. FEELING DOWN, DEPRESSED OR HOPELESS: 0
1. LITTLE INTEREST OR PLEASURE IN DOING THINGS: 0

## 2019-01-07 ENCOUNTER — OFFICE VISIT (OUTPATIENT)
Dept: FAMILY MEDICINE CLINIC | Age: 64
End: 2019-01-07
Payer: COMMERCIAL

## 2019-01-07 VITALS
DIASTOLIC BLOOD PRESSURE: 76 MMHG | HEIGHT: 67 IN | WEIGHT: 194 LBS | SYSTOLIC BLOOD PRESSURE: 130 MMHG | HEART RATE: 60 BPM | BODY MASS INDEX: 30.45 KG/M2

## 2019-01-07 DIAGNOSIS — R35.0 URINARY FREQUENCY: ICD-10-CM

## 2019-01-07 DIAGNOSIS — N30.00 ACUTE CYSTITIS WITHOUT HEMATURIA: Primary | ICD-10-CM

## 2019-01-07 LAB
BILIRUBIN, POC: NEGATIVE
BLOOD URINE, POC: NEGATIVE
CLARITY, POC: CLEAR
COLOR, POC: YELLOW
GLUCOSE URINE, POC: NEGATIVE
KETONES, POC: NORMAL
LEUKOCYTE EST, POC: NORMAL
NITRITE, POC: NORMAL
PH, POC: 5.5
PROTEIN, POC: NEGATIVE
SPECIFIC GRAVITY, POC: 1.02
UROBILINOGEN, POC: 0.2

## 2019-01-07 PROCEDURE — G8427 DOCREV CUR MEDS BY ELIG CLIN: HCPCS | Performed by: FAMILY MEDICINE

## 2019-01-07 PROCEDURE — 99213 OFFICE O/P EST LOW 20 MIN: CPT | Performed by: FAMILY MEDICINE

## 2019-01-07 PROCEDURE — 3017F COLORECTAL CA SCREEN DOC REV: CPT | Performed by: FAMILY MEDICINE

## 2019-01-07 PROCEDURE — G8417 CALC BMI ABV UP PARAM F/U: HCPCS | Performed by: FAMILY MEDICINE

## 2019-01-07 PROCEDURE — G8484 FLU IMMUNIZE NO ADMIN: HCPCS | Performed by: FAMILY MEDICINE

## 2019-01-07 PROCEDURE — 1036F TOBACCO NON-USER: CPT | Performed by: FAMILY MEDICINE

## 2019-01-07 PROCEDURE — 81002 URINALYSIS NONAUTO W/O SCOPE: CPT | Performed by: FAMILY MEDICINE

## 2019-01-07 RX ORDER — SULFAMETHOXAZOLE AND TRIMETHOPRIM 800; 160 MG/1; MG/1
TABLET ORAL
Qty: 60 TABLET | Refills: 0 | Status: SHIPPED | OUTPATIENT
Start: 2019-01-07 | End: 2019-02-18 | Stop reason: ALTCHOICE

## 2019-01-07 ASSESSMENT — ENCOUNTER SYMPTOMS
NAUSEA: 0
CONSTIPATION: 1
VOMITING: 0

## 2019-02-18 ENCOUNTER — OFFICE VISIT (OUTPATIENT)
Dept: FAMILY MEDICINE CLINIC | Age: 64
End: 2019-02-18
Payer: COMMERCIAL

## 2019-02-18 VITALS
HEART RATE: 72 BPM | BODY MASS INDEX: 30.89 KG/M2 | HEIGHT: 67 IN | SYSTOLIC BLOOD PRESSURE: 136 MMHG | DIASTOLIC BLOOD PRESSURE: 84 MMHG | WEIGHT: 196.8 LBS

## 2019-02-18 DIAGNOSIS — K21.9 GASTROESOPHAGEAL REFLUX DISEASE WITHOUT ESOPHAGITIS: ICD-10-CM

## 2019-02-18 DIAGNOSIS — E78.1 PURE HYPERGLYCERIDEMIA: Chronic | ICD-10-CM

## 2019-02-18 PROCEDURE — 3017F COLORECTAL CA SCREEN DOC REV: CPT | Performed by: FAMILY MEDICINE

## 2019-02-18 PROCEDURE — 1036F TOBACCO NON-USER: CPT | Performed by: FAMILY MEDICINE

## 2019-02-18 PROCEDURE — G8427 DOCREV CUR MEDS BY ELIG CLIN: HCPCS | Performed by: FAMILY MEDICINE

## 2019-02-18 PROCEDURE — G8484 FLU IMMUNIZE NO ADMIN: HCPCS | Performed by: FAMILY MEDICINE

## 2019-02-18 PROCEDURE — G8417 CALC BMI ABV UP PARAM F/U: HCPCS | Performed by: FAMILY MEDICINE

## 2019-02-18 PROCEDURE — 99213 OFFICE O/P EST LOW 20 MIN: CPT | Performed by: FAMILY MEDICINE

## 2019-02-18 RX ORDER — FENOFIBRATE 145 MG/1
145 TABLET, COATED ORAL DAILY
Qty: 90 TABLET | Refills: 1 | Status: SHIPPED | OUTPATIENT
Start: 2019-02-18 | End: 2019-08-19 | Stop reason: SDUPTHER

## 2019-02-18 RX ORDER — PANTOPRAZOLE SODIUM 40 MG/1
40 TABLET, DELAYED RELEASE ORAL DAILY
Qty: 90 TABLET | Refills: 1 | Status: SHIPPED | OUTPATIENT
Start: 2019-02-18 | End: 2019-08-19 | Stop reason: SDUPTHER

## 2019-02-18 ASSESSMENT — PATIENT HEALTH QUESTIONNAIRE - PHQ9
2. FEELING DOWN, DEPRESSED OR HOPELESS: 0
SUM OF ALL RESPONSES TO PHQ QUESTIONS 1-9: 0
1. LITTLE INTEREST OR PLEASURE IN DOING THINGS: 0
SUM OF ALL RESPONSES TO PHQ9 QUESTIONS 1 & 2: 0
SUM OF ALL RESPONSES TO PHQ QUESTIONS 1-9: 0

## 2019-02-18 ASSESSMENT — ENCOUNTER SYMPTOMS
RESPIRATORY NEGATIVE: 1
GASTROINTESTINAL NEGATIVE: 1

## 2019-05-11 LAB
ALBUMIN SERPL-MCNC: 4.6 G/DL
ALP BLD-CCNC: 53 U/L
ALT SERPL-CCNC: 17 U/L
ANION GAP SERPL CALCULATED.3IONS-SCNC: NORMAL MMOL/L
AST SERPL-CCNC: 21 U/L
BASOPHILS ABSOLUTE: 0.1 /ΜL
BASOPHILS RELATIVE PERCENT: 1.5 %
BILIRUB SERPL-MCNC: 0.4 MG/DL (ref 0.1–1.4)
BUN BLDV-MCNC: 21 MG/DL
CALCIUM SERPL-MCNC: 9.9 MG/DL
CHLORIDE BLD-SCNC: 104 MMOL/L
CHOLESTEROL, TOTAL: 157 MG/DL
CHOLESTEROL/HDL RATIO: NORMAL
CO2: 28 MMOL/L
CREAT SERPL-MCNC: 0.8 MG/DL
EOSINOPHILS ABSOLUTE: 0.2 /ΜL
EOSINOPHILS RELATIVE PERCENT: 4.4 %
GFR CALCULATED: 72
GLUCOSE BLD-MCNC: 93 MG/DL
HCT VFR BLD CALC: 39.3 % (ref 36–46)
HDLC SERPL-MCNC: 51 MG/DL (ref 35–70)
HEMOGLOBIN: 12.9 G/DL (ref 12–16)
LDL CHOLESTEROL CALCULATED: 84 MG/DL (ref 0–160)
LYMPHOCYTES ABSOLUTE: 1.9 /ΜL
LYMPHOCYTES RELATIVE PERCENT: 36.1 %
MCH RBC QN AUTO: 30.9 PG
MCHC RBC AUTO-ENTMCNC: 32.9 G/DL
MCV RBC AUTO: 94.1 FL
MONOCYTES ABSOLUTE: 0.4 /ΜL
MONOCYTES RELATIVE PERCENT: 8.3 %
NEUTROPHILS ABSOLUTE: 2.6 /ΜL
NEUTROPHILS RELATIVE PERCENT: 49.7 %
PDW BLD-RTO: 12.5 %
PHOSPHORUS: 3.5 MG/DL
PLATELET # BLD: 295 K/ΜL
PMV BLD AUTO: 9.3 FL
POTASSIUM SERPL-SCNC: 4.2 MMOL/L
RBC # BLD: 4.18 10^6/ΜL
SODIUM BLD-SCNC: 140 MMOL/L
TOTAL PROTEIN: 6.6
TRIGL SERPL-MCNC: 111 MG/DL
TSH SERPL DL<=0.05 MIU/L-ACNC: 1.74 UIU/ML
VLDLC SERPL CALC-MCNC: 22 MG/DL
WBC # BLD: 5.2 10^3/ML

## 2019-08-19 ENCOUNTER — OFFICE VISIT (OUTPATIENT)
Dept: FAMILY MEDICINE CLINIC | Age: 64
End: 2019-08-19
Payer: MEDICARE

## 2019-08-19 VITALS
WEIGHT: 196.4 LBS | SYSTOLIC BLOOD PRESSURE: 134 MMHG | BODY MASS INDEX: 30.83 KG/M2 | HEART RATE: 84 BPM | DIASTOLIC BLOOD PRESSURE: 84 MMHG | HEIGHT: 67 IN

## 2019-08-19 DIAGNOSIS — E78.1 PURE HYPERGLYCERIDEMIA: Primary | Chronic | ICD-10-CM

## 2019-08-19 DIAGNOSIS — K21.9 GASTROESOPHAGEAL REFLUX DISEASE WITHOUT ESOPHAGITIS: ICD-10-CM

## 2019-08-19 DIAGNOSIS — Z23 NEED FOR PROPHYLACTIC VACCINATION AGAINST STREPTOCOCCUS PNEUMONIAE (PNEUMOCOCCUS): ICD-10-CM

## 2019-08-19 PROCEDURE — G0009 ADMIN PNEUMOCOCCAL VACCINE: HCPCS | Performed by: FAMILY MEDICINE

## 2019-08-19 PROCEDURE — 99213 OFFICE O/P EST LOW 20 MIN: CPT | Performed by: FAMILY MEDICINE

## 2019-08-19 PROCEDURE — 90670 PCV13 VACCINE IM: CPT | Performed by: FAMILY MEDICINE

## 2019-08-19 RX ORDER — PANTOPRAZOLE SODIUM 40 MG/1
40 TABLET, DELAYED RELEASE ORAL DAILY
Qty: 90 TABLET | Refills: 1 | Status: SHIPPED | OUTPATIENT
Start: 2019-08-19 | End: 2020-02-17

## 2019-08-19 RX ORDER — FENOFIBRATE 145 MG/1
145 TABLET, COATED ORAL DAILY
Qty: 90 TABLET | Refills: 1 | Status: SHIPPED | OUTPATIENT
Start: 2019-08-19 | End: 2020-02-17

## 2019-08-19 ASSESSMENT — ENCOUNTER SYMPTOMS
SHORTNESS OF BREATH: 0
GASTROINTESTINAL NEGATIVE: 1
RESPIRATORY NEGATIVE: 1

## 2019-08-20 NOTE — PROGRESS NOTES
Allergies. Subjective:      Review of Systems   Constitutional: Negative. Negative for activity change, appetite change and unexpected weight change. HENT: Negative. Respiratory: Negative. Negative for shortness of breath. Cardiovascular: Negative. Negative for chest pain. Gastrointestinal: Negative. Genitourinary: Negative. Musculoskeletal: Negative. Negative for myalgias. Skin: Negative. Neurological: Negative. Hematological: Negative. Psychiatric/Behavioral: Negative. Negative for dysphoric mood. Objective:     /84 (Site: Left Upper Arm, Position: Sitting, Cuff Size: Large Adult)   Pulse 84   Ht 5' 7\" (1.702 m)   Wt 196 lb 6.4 oz (89.1 kg)   BMI 30.76 kg/m²     Physical Exam   Constitutional: She is oriented to person, place, and time. She appears well-developed and well-nourished. Neck: Normal range of motion. Neck supple. No thyromegaly present. Cardiovascular: Normal rate, regular rhythm, normal heart sounds and intact distal pulses. Exam reveals no gallop and no friction rub. No murmur heard. Pulmonary/Chest: Effort normal and breath sounds normal.   Abdominal: Soft. She exhibits no mass. There is no splenomegaly or hepatomegaly. There is no tenderness. Musculoskeletal: She exhibits no edema or tenderness. Lymphadenopathy:     She has no cervical adenopathy. Neurological: She is alert and oriented to person, place, and time. Ambulatory. No tremors. Skin: Skin is warm and dry. No rash noted. Psychiatric: She has a normal mood and affect. Vitals reviewed. Assessment:       Diagnosis Orders   1. Pure hyperglyceridemia  fenofibrate (TRICOR) 145 MG tablet   2. Gastroesophageal reflux disease without esophagitis  pantoprazole (PROTONIX) 40 MG tablet   3.  Need for prophylactic vaccination against Streptococcus pneumoniae (pneumococcus)  Pneumococcal conjugate vaccine 13-valent       Plan:       Return in about 6 months (around 2/19/2020) for CHOL, check-up.     Orders Placed:  Orders Placed This Encounter   Procedures    Pneumococcal conjugate vaccine 13-valent     Medications Prescribed:  Orders Placed This Encounter   Medications    fenofibrate (TRICOR) 145 MG tablet     Sig: Take 1 tablet by mouth daily     Dispense:  90 tablet     Refill:  1    pantoprazole (PROTONIX) 40 MG tablet     Sig: Take 1 tablet by mouth daily     Dispense:  90 tablet     Refill:  1         Electronically signed by Unruly Garnett 8/19/2019 at 8:31 PM

## 2019-08-21 ENCOUNTER — HOSPITAL ENCOUNTER (OUTPATIENT)
Dept: MAMMOGRAPHY | Age: 64
Discharge: HOME | End: 2019-08-21
Payer: MEDICARE

## 2019-08-21 DIAGNOSIS — Z12.39 BREAST CANCER SCREENING: ICD-10-CM

## 2019-08-21 PROCEDURE — 77063 BREAST TOMOSYNTHESIS BI: CPT

## 2020-02-25 ENCOUNTER — OFFICE VISIT (OUTPATIENT)
Dept: FAMILY MEDICINE CLINIC | Age: 65
End: 2020-02-25
Payer: MEDICARE

## 2020-02-25 ENCOUNTER — TELEPHONE (OUTPATIENT)
Dept: FAMILY MEDICINE CLINIC | Age: 65
End: 2020-02-25

## 2020-02-25 ENCOUNTER — HOSPITAL ENCOUNTER (OUTPATIENT)
Age: 65
Discharge: HOME | End: 2020-02-25
Payer: MEDICARE

## 2020-02-25 ENCOUNTER — HOSPITAL ENCOUNTER (OUTPATIENT)
Dept: GENERAL RADIOLOGY | Age: 65
Discharge: HOME | End: 2020-02-25
Payer: MEDICARE

## 2020-02-25 VITALS
WEIGHT: 196.8 LBS | SYSTOLIC BLOOD PRESSURE: 130 MMHG | HEIGHT: 67 IN | BODY MASS INDEX: 30.89 KG/M2 | HEART RATE: 80 BPM | DIASTOLIC BLOOD PRESSURE: 80 MMHG

## 2020-02-25 PROCEDURE — 99213 OFFICE O/P EST LOW 20 MIN: CPT | Performed by: FAMILY MEDICINE

## 2020-02-25 PROCEDURE — 73630 X-RAY EXAM OF FOOT: CPT

## 2020-02-25 PROCEDURE — G8510 SCR DEP NEG, NO PLAN REQD: HCPCS | Performed by: FAMILY MEDICINE

## 2020-02-25 ASSESSMENT — ENCOUNTER SYMPTOMS
SHORTNESS OF BREATH: 0
ABDOMINAL PAIN: 0
RESPIRATORY NEGATIVE: 1
GASTROINTESTINAL NEGATIVE: 1

## 2020-02-25 ASSESSMENT — PATIENT HEALTH QUESTIONNAIRE - PHQ9
1. LITTLE INTEREST OR PLEASURE IN DOING THINGS: 0
SUM OF ALL RESPONSES TO PHQ QUESTIONS 1-9: 0
SUM OF ALL RESPONSES TO PHQ9 QUESTIONS 1 & 2: 0
2. FEELING DOWN, DEPRESSED OR HOPELESS: 0
SUM OF ALL RESPONSES TO PHQ QUESTIONS 1-9: 0

## 2020-02-25 NOTE — TELEPHONE ENCOUNTER
----- Message from Jeff Recinos MD sent at 2/25/2020 12:28 PM EST -----  There is a minor fracture of fifth toe.    Good shoe and several weeks to heal.

## 2020-02-25 NOTE — PROGRESS NOTES
[facility]  [address] [facility]  Dept: [phone]  Dept Fax: [phone]: [phone]    Visit Date: 2/25/2020    Corey Simmons is a 71 y. 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 Recently injured left foot and now with bruising and swelling. Hyperlipidemia   This is a chronic problem. The current episode started more than 1 year ago. Recent lipid tests were reviewed and are normal. She has no history of diabetes or hypothyroidism. There are no known factors aggravating her hyperlipidemia. Pertinent negatives include no chest pain, myalgias or shortness of breath. Current antihyperlipidemic treatment includes fibric acid derivatives. The current treatment provides significant improvement of lipids. There are no compliance problems. Risk factors for coronary artery disease include dyslipidemia, family history and post-menopausal.       Current Outpatient Medications   Medication Sig Dispense Refill    pantoprazole (PROTONIX) 40 MG tablet TAKE 1 TABLET DAILY 90 tablet 3    fenofibrate (TRICOR) 145 MG tablet TAKE 1 TABLET DAILY 90 tablet 3    zinc gluconate 50 MG tablet Take 50 mg by mouth daily      vitamin B-12 (CYANOCOBALAMIN) 1000 MCG tablet Take 1,000 mcg by mouth daily      Ascorbic Acid (VITAMIN C) 500 MG CAPS Take 1 tablet by mouth daily      Aspirin-Acetaminophen-Caffeine (EXCEDRIN EXTRA STRENGTH PO) Take by mouth daily as needed      Biotin 5000 MCG TABS Take 5,000 mcg by mouth daily        No current facility-administered medications for this visit. The patient has No Known Allergies. Subjective:      Review of Systems   Constitutional: Positive for activity change. Negative for appetite change and unexpected weight change.

## 2020-07-08 ENCOUNTER — OFFICE VISIT (OUTPATIENT)
Dept: FAMILY MEDICINE CLINIC | Age: 65
End: 2020-07-08
Payer: MEDICARE

## 2020-07-08 VITALS
WEIGHT: 195 LBS | HEIGHT: 67 IN | DIASTOLIC BLOOD PRESSURE: 80 MMHG | SYSTOLIC BLOOD PRESSURE: 132 MMHG | BODY MASS INDEX: 30.61 KG/M2 | HEART RATE: 64 BPM | TEMPERATURE: 97.3 F

## 2020-07-08 LAB
BILIRUBIN, POC: NEGATIVE
BLOOD URINE, POC: NEGATIVE
CLARITY, POC: ABNORMAL
COLOR, POC: YELLOW
GLUCOSE URINE, POC: NEGATIVE
KETONES, POC: NEGATIVE
LEUKOCYTE EST, POC: ABNORMAL
NITRITE, POC: POSITIVE
PH, POC: 7
PROTEIN, POC: NEGATIVE
SPECIFIC GRAVITY, POC: >=1.03
UROBILINOGEN, POC: ABNORMAL

## 2020-07-08 PROCEDURE — 99213 OFFICE O/P EST LOW 20 MIN: CPT | Performed by: FAMILY MEDICINE

## 2020-07-08 PROCEDURE — 81003 URINALYSIS AUTO W/O SCOPE: CPT | Performed by: FAMILY MEDICINE

## 2020-07-08 RX ORDER — HYDROCORTISONE AND ACETIC ACID 20.75; 10.375 MG/ML; MG/ML
3 SOLUTION AURICULAR (OTIC) 2 TIMES DAILY
Qty: 1 BOTTLE | Refills: 1 | Status: SHIPPED | OUTPATIENT
Start: 2020-07-08 | End: 2020-08-28

## 2020-07-08 RX ORDER — SULFAMETHOXAZOLE AND TRIMETHOPRIM 800; 160 MG/1; MG/1
1 TABLET ORAL 2 TIMES DAILY
Qty: 14 TABLET | Refills: 0 | Status: SHIPPED | OUTPATIENT
Start: 2020-07-08 | End: 2020-07-15

## 2020-07-08 RX ORDER — HYDROCORTISONE AND ACETIC ACID 20.75; 10.375 MG/ML; MG/ML
3 SOLUTION AURICULAR (OTIC) 2 TIMES DAILY
COMMUNITY
End: 2020-07-08 | Stop reason: SDUPTHER

## 2020-07-08 SDOH — ECONOMIC STABILITY: FOOD INSECURITY: WITHIN THE PAST 12 MONTHS, YOU WORRIED THAT YOUR FOOD WOULD RUN OUT BEFORE YOU GOT MONEY TO BUY MORE.: NEVER TRUE

## 2020-07-08 SDOH — ECONOMIC STABILITY: FOOD INSECURITY: WITHIN THE PAST 12 MONTHS, THE FOOD YOU BOUGHT JUST DIDN'T LAST AND YOU DIDN'T HAVE MONEY TO GET MORE.: NEVER TRUE

## 2020-07-08 SDOH — ECONOMIC STABILITY: TRANSPORTATION INSECURITY
IN THE PAST 12 MONTHS, HAS THE LACK OF TRANSPORTATION KEPT YOU FROM MEDICAL APPOINTMENTS OR FROM GETTING MEDICATIONS?: NO

## 2020-07-08 SDOH — ECONOMIC STABILITY: TRANSPORTATION INSECURITY
IN THE PAST 12 MONTHS, HAS LACK OF TRANSPORTATION KEPT YOU FROM MEETINGS, WORK, OR FROM GETTING THINGS NEEDED FOR DAILY LIVING?: NO

## 2020-07-08 SDOH — ECONOMIC STABILITY: INCOME INSECURITY: HOW HARD IS IT FOR YOU TO PAY FOR THE VERY BASICS LIKE FOOD, HOUSING, MEDICAL CARE, AND HEATING?: NOT HARD AT ALL

## 2020-07-08 NOTE — PROGRESS NOTES
[facility] PROFESSIONAL [facility]  [facility]  [address] [facility]  Dept: [phone]  Dept Fax: [phone]  Loc: [phone]  PROGRESS NOTE      Visit Date: 7/8/2020    Ezequiel Nolan is a 76 y.o. female who presents today for:  Chief Complaint   Patient presents with    Urinary Tract Infection     increase urination       Subjective:  HPI      UTI symptoms:  Has odor and frequency. Has been drinking more water. Started several weeks ago. No meds tried. Most recent UTI was jan 2019. Ear itching. On vosol HC intermittently. Review of Systems   Constitutional: Negative for chills and fever. Genitourinary: Positive for frequency and urgency. Negative for dysuria, flank pain, hematuria and vaginal discharge. Past Medical History:   Diagnosis Date    GERD (gastroesophageal reflux disease)     Hyperlipidemia       Current Outpatient Medications   Medication Sig Dispense Refill    acetic acid-hydrocortisone (VOSOL-HC) 1-2 % otic solution Place 3 drops into both ears 2 times daily      pantoprazole (PROTONIX) 40 MG tablet TAKE 1 TABLET DAILY 90 tablet 3    fenofibrate (TRICOR) 145 MG tablet TAKE 1 TABLET DAILY 90 tablet 3    zinc gluconate 50 MG tablet Take 50 mg by mouth daily      vitamin B-12 (CYANOCOBALAMIN) 1000 MCG tablet Take 1,000 mcg by mouth daily      Ascorbic Acid (VITAMIN C) 500 MG CAPS Take 1 tablet by mouth daily      Aspirin-Acetaminophen-Caffeine (EXCEDRIN EXTRA STRENGTH PO) Take by mouth daily as needed      Biotin 5000 MCG TABS Take 5,000 mcg by mouth daily        No current facility-administered medications for this visit. No Known Allergies    Objective:     BP (!) 140/78 (Site: Right Upper Arm, Position: Sitting, Cuff Size: Medium Adult)   Pulse 64   Temp 97.3 °F (36.3 °C) (Temporal)   Ht 5' 7\" (1.702 m)   Wt 195 lb (88.5 kg)   BMI 30.54 kg/m²   Physical Exam  Vitals signs reviewed.    Constitutional: General: She is not in acute distress. Appearance: She is well-developed. She is not ill-appearing. Cardiovascular:      Rate and Rhythm: Normal rate and regular rhythm. Heart sounds: No murmur. Pulmonary:      Effort: Pulmonary effort is normal. No respiratory distress. Breath sounds: Normal breath sounds. No wheezing or rhonchi. Abdominal:      General: There is no distension. Tenderness: There is no abdominal tenderness. There is no right CVA tenderness or left CVA tenderness. Neurological:      Mental Status: She is alert and oriented to person, place, and time. Mental status is at baseline. Psychiatric:         Mood and Affect: Mood normal.         Behavior: Behavior normal.         Impression/Plan:  1. Acute cystitis without hematuria  UA is consistent with UTI. Start Bactrim. Likely pyelonephritis. .  There is not enough urine to culture. Discussed that if she is not better in 48 hours she should call the office and we will change antibiotic to RAGSDALE RASHEEDA. - sulfamethoxazole-trimethoprim (BACTRIM DS;SEPTRA DS) 800-160 MG per tablet; Take 1 tablet by mouth 2 times daily for 7 days  Dispense: 14 tablet; Refill: 0    2. UTI symptoms  - POCT Urinalysis No Micro (Auto)    3. Ear itching  Med refill. acetic acid-hydrocortisone (VOSOL-HC) 1-2 % otic solution; Place 3 drops into both ears 2 times daily  Dispense: 1 Bottle; Refill: 1        They voiced understanding. All questions answered. They agreed with treatment plan. See patient instructions for any educational materials that may have been given. Discussed use, benefit, and side effects of prescribed medications. (Please note that portions of this note may have been completed with a voice recognition program.  Efforts were made to edit the dictation but occasionally words are mis-transcribed.)    Return if symptoms worsen or fail to improve.        Electronically signed by Mariah Rangel MD on 7/8/2020 at 10:21 AM

## 2020-08-06 ENCOUNTER — TELEPHONE (OUTPATIENT)
Dept: FAMILY MEDICINE CLINIC | Age: 65
End: 2020-08-06

## 2020-08-06 NOTE — TELEPHONE ENCOUNTER
Ok for her to drop UA and It Culture. We can go from there and schedule appointment later. Not able to place order from my Android phone however.

## 2020-08-06 NOTE — TELEPHONE ENCOUNTER
Seen Dr. Diamond Lara for UTI.  UTI is not completely gone. Did take all of her medication. She wants to have Dr. Sherine Santamaria as her new pcp. No available appointments. Can she bring sample in, does she need OV or can the Bactrim be re-scripted? Please advise pt of your decision.

## 2020-08-07 NOTE — TELEPHONE ENCOUNTER
I saw this yesterday after leaving office. My phone would only allow me to send to sender and not pool. Not sure what happened since then.  _____________________    [id number] for her to drop UA and It Culture. We can go from there and schedule appointment later. Not able to place order from my Android phone however.

## 2020-08-07 NOTE — TELEPHONE ENCOUNTER
Spoke with patient. She is pushing fluids. She can not make it in today to give us a urine. She said if still having symptoms she will stop in on Monday and give us a sample.

## 2020-08-10 ENCOUNTER — NURSE ONLY (OUTPATIENT)
Dept: FAMILY MEDICINE CLINIC | Age: 65
End: 2020-08-10
Payer: MEDICARE

## 2020-08-10 LAB
BILIRUBIN, POC: NEGATIVE
BLOOD URINE, POC: NEGATIVE
CLARITY, POC: ABNORMAL
COLOR, POC: YELLOW
GLUCOSE URINE, POC: NEGATIVE
KETONES, POC: NEGATIVE
LEUKOCYTE EST, POC: ABNORMAL
NITRITE, POC: POSITIVE
PH, POC: 6.5
PROTEIN, POC: NEGATIVE
SPECIFIC GRAVITY, POC: ABNORMAL
UROBILINOGEN, POC: ABNORMAL

## 2020-08-10 PROCEDURE — 99211 OFF/OP EST MAY X REQ PHY/QHP: CPT | Performed by: FAMILY MEDICINE

## 2020-08-10 PROCEDURE — 81003 URINALYSIS AUTO W/O SCOPE: CPT | Performed by: FAMILY MEDICINE

## 2020-08-10 RX ORDER — CEPHALEXIN 500 MG/1
500 CAPSULE ORAL 3 TIMES DAILY
Qty: 21 CAPSULE | Refills: 0 | Status: SHIPPED | OUTPATIENT
Start: 2020-08-10 | End: 2020-08-17

## 2020-08-10 NOTE — PROGRESS NOTES
Due to + nitrite in UA, will start medication keflex. Failed bactrim.   Patient notified via mychart

## 2020-08-11 ENCOUNTER — TELEPHONE (OUTPATIENT)
Dept: FAMILY MEDICINE CLINIC | Age: 65
End: 2020-08-11

## 2020-08-11 NOTE — TELEPHONE ENCOUNTER
Sweetie calls looking for results of urinalysis and culture. Informed her culture could take couple days and Keflex was called in.

## 2020-08-12 ENCOUNTER — TELEPHONE (OUTPATIENT)
Dept: FAMILY MEDICINE CLINIC | Age: 65
End: 2020-08-12

## 2020-08-12 LAB
ORGANISM: ABNORMAL
URINE CULTURE, ROUTINE: ABNORMAL

## 2020-08-12 RX ORDER — CIPROFLOXACIN 500 MG/1
500 TABLET, FILM COATED ORAL 2 TIMES DAILY
Qty: 14 TABLET | Refills: 0 | Status: SHIPPED | OUTPATIENT
Start: 2020-08-12 | End: 2020-08-19

## 2020-08-12 NOTE — TELEPHONE ENCOUNTER
----- Message from Boogie Garber MD sent at 8/12/2020  8:20 AM EDT -----  Please let her know that culture of urine came back showing the E.coli growth is resistance to both sulfa drug and cephalexin. So keflex will not work. Recommend switch to Ciprofloxacin 500 mg 1 po bid x 7 days, sent to pharmacy. Also recommend probiotics via yogurt/kefir or supplement -- 2 hours apart from probiotic. This is mostly due to being on several antibiotics in the last couple of weeks.

## 2020-08-28 ENCOUNTER — OFFICE VISIT (OUTPATIENT)
Dept: FAMILY MEDICINE CLINIC | Age: 65
End: 2020-08-28
Payer: MEDICARE

## 2020-08-28 VITALS
HEART RATE: 76 BPM | HEIGHT: 67 IN | SYSTOLIC BLOOD PRESSURE: 136 MMHG | TEMPERATURE: 97.9 F | BODY MASS INDEX: 30.79 KG/M2 | DIASTOLIC BLOOD PRESSURE: 72 MMHG | WEIGHT: 196.2 LBS

## 2020-08-28 PROBLEM — K59.09 CONSTIPATION, CHRONIC: Status: ACTIVE | Noted: 2020-08-28

## 2020-08-28 LAB
BILIRUBIN, POC: NEGATIVE
BLOOD URINE, POC: NEGATIVE
CLARITY, POC: CLEAR
COLOR, POC: YELLOW
GLUCOSE URINE, POC: NEGATIVE
KETONES, POC: NEGATIVE
LEUKOCYTE EST, POC: NORMAL
NITRITE, POC: NEGATIVE
PH, POC: 7
PROTEIN, POC: NEGATIVE
SPECIFIC GRAVITY, POC: 1.02
UROBILINOGEN, POC: NORMAL

## 2020-08-28 PROCEDURE — 81002 URINALYSIS NONAUTO W/O SCOPE: CPT | Performed by: FAMILY MEDICINE

## 2020-08-28 PROCEDURE — 3288F FALL RISK ASSESSMENT DOCD: CPT | Performed by: FAMILY MEDICINE

## 2020-08-28 PROCEDURE — 99214 OFFICE O/P EST MOD 30 MIN: CPT | Performed by: FAMILY MEDICINE

## 2020-08-28 RX ORDER — PHENAZOPYRIDINE HYDROCHLORIDE 95 MG/1
95 TABLET ORAL 3 TIMES DAILY PRN
COMMUNITY
Start: 2020-08-28 | End: 2020-08-31

## 2020-08-28 ASSESSMENT — ENCOUNTER SYMPTOMS
DIARRHEA: 0
SHORTNESS OF BREATH: 0
NAUSEA: 0
ABDOMINAL PAIN: 0
VOMITING: 0
CONSTIPATION: 1

## 2020-08-28 NOTE — PROGRESS NOTES
[address] [place] [address] [address], [place]  Phone:  [phone]  SOV:[phone]       Name: Ezequiel Nolan  : 1955    Chief Complaint   Patient presents with    Urinary Tract Infection       HPI:     Ezequiel Nolan is a 76 y.o. female who presents today for     HPI    Current symptoms: slight sensitivity of the bladder lingering. No Dysuria, frequency. No blood. Back pain? None. No fever/chills. Had incontinence and was very embarrassed as was on vacation. Treated  -- e.coli noted. Keflex stopped as resistant. Given Cipro 500 mg BID x 7 days. UA here shows only small leuk    Constipation her whole life. Skips 1-2 days. Not sure what to do. Had enemas in the past.      Also has a history of high cholesterol. Needs follow-up on that. She was hoping to do fair labs which are more economical for her. Has adequate medication for such currently. Current Outpatient Medications:     phenazopyridine (PYRIDIUM) 95 MG tablet, Take 1 tablet by mouth 3 times daily as needed for Pain, Disp: , Rfl:     pantoprazole (PROTONIX) 40 MG tablet, TAKE 1 TABLET DAILY, Disp: 90 tablet, Rfl: 3    fenofibrate (TRICOR) 145 MG tablet, TAKE 1 TABLET DAILY, Disp: 90 tablet, Rfl: 3    zinc gluconate 50 MG tablet, Take 50 mg by mouth daily, Disp: , Rfl:     vitamin B-12 (CYANOCOBALAMIN) 1000 MCG tablet, Take 1,000 mcg by mouth daily, Disp: , Rfl:     Ascorbic Acid (VITAMIN C) 500 MG CAPS, Take 1 tablet by mouth daily, Disp: , Rfl:     Aspirin-Acetaminophen-Caffeine (EXCEDRIN EXTRA STRENGTH PO), Take by mouth daily as needed, Disp: , Rfl:     Biotin 5000 MCG TABS, Take 5,000 mcg by mouth daily , Disp: , Rfl:     No Known Allergies    Review of Systems   Constitutional: Negative for fatigue and fever. Respiratory: Negative for shortness of breath. Cardiovascular: Negative for chest pain and leg swelling. Gastrointestinal: Positive for constipation.  Negative for abdominal CHOLHDLRATIO      Assessment/Plan:     Elsa Mead was seen today for urinary tract infection. Diagnoses and all orders for this visit:    Acute cystitis without hematuria  -     Culture, Urine  -     POCT Urinalysis no Micro  -     phenazopyridine (PYRIDIUM) 95 MG tablet; Take 1 tablet by mouth 3 times daily as needed for Pain    Constipation, chronic    Mixed hyperlipidemia  -     CBC Auto Differential; Future  -     Basic Metabolic Panel; Future  -     Hepatic Function Panel; Future    UTI seems better but not completely resolved  Constipation is a new issue for us to address  Cholesterol follow up needed. Urine here does not show enough to treat again. We will use Pyridium and water intake to help with lingering symptoms  We discussed the link between constipation and urinary tract infection. She would like to address this more consistently. We discussed the diet that would be helpful towards this. Slow adjustment to higher fiber diet recommended. Good water intake also recommended  She was open to using probiotic-containing foods with some flaxseed to help her condition. We discussed other over-the-counter medications that also has been shown to be helpful constipation that she can add if needed. Return in about 5 months (around 1/28/2021).     Future Appointments   Date Time Provider Sonya Mueller   1/21/2021 11:00 AM Andra Donald MD [facility]        Electronically signed by Andra Donald MD on 8/28/2020 at 11:07 AM

## 2020-08-28 NOTE — PATIENT INSTRUCTIONS
take.  · Drink plenty of fluids, enough so that your urine is light yellow or clear like water. If you have kidney, heart, or liver disease and have to limit fluids, talk with your doctor before you increase the amount of fluids you drink. · Get some exercise every day. Exercise helps stool move through the colon. It also helps prevent constipation. · Keep a food diary. Try to notice and write down what foods cause gas, pain, or other symptoms. Then you can avoid these foods. Where can you learn more? Go to [email or web address] and sign in to your [employer] account. Enter G104 in the [employer] box to learn more about \"High-Fiber Diet: Care Instructions. \"     If you do not have an account, please click on the \"Sign Up Now\" link. Current as of: August 22, 2019               Content Version: 12.5  © 0812-9785 Healthwise, Incorporated. Care instructions adapted under license by [facility] ([facility]). If you have questions about a medical condition or this instruction, always ask your healthcare professional. [address] any warranty or liability for your use of this information.

## 2020-08-30 LAB — URINE CULTURE, ROUTINE: NORMAL

## 2020-10-07 ENCOUNTER — HOSPITAL ENCOUNTER (OUTPATIENT)
Dept: MAMMOGRAPHY | Age: 65
Discharge: HOME | End: 2020-10-07
Payer: MEDICARE

## 2020-10-07 PROCEDURE — 77063 BREAST TOMOSYNTHESIS BI: CPT

## 2021-01-12 ENCOUNTER — HOSPITAL ENCOUNTER (OUTPATIENT)
Age: 66
Discharge: HOME | End: 2021-01-12
Payer: MEDICARE

## 2021-01-12 DIAGNOSIS — E78.2 MIXED HYPERLIPIDEMIA: Chronic | ICD-10-CM

## 2021-01-12 LAB
ALBUMIN SERPL-MCNC: 4.4 G/DL (ref 3.5–5.1)
ALP BLD-CCNC: 48 U/L (ref 38–126)
ALT SERPL-CCNC: 16 U/L (ref 11–66)
ANION GAP SERPL CALCULATED.3IONS-SCNC: 9 MEQ/L (ref 8–16)
AST SERPL-CCNC: 23 U/L (ref 5–40)
BASOPHILS # BLD: 1.1 %
BASOPHILS ABSOLUTE: 0.1 THOU/MM3 (ref 0–0.1)
BILIRUB SERPL-MCNC: 0.3 MG/DL (ref 0.3–1.2)
BILIRUBIN DIRECT: < 0.2 MG/DL (ref 0–0.3)
BUN BLDV-MCNC: 20 MG/DL (ref 7–22)
CALCIUM SERPL-MCNC: 9.8 MG/DL (ref 8.5–10.5)
CHLORIDE BLD-SCNC: 102 MEQ/L (ref 98–111)
CO2: 29 MEQ/L (ref 23–33)
CREAT SERPL-MCNC: 1.1 MG/DL (ref 0.4–1.2)
EOSINOPHIL # BLD: 3.9 %
EOSINOPHILS ABSOLUTE: 0.2 THOU/MM3 (ref 0–0.4)
ERYTHROCYTE [DISTWIDTH] IN BLOOD BY AUTOMATED COUNT: 11.8 % (ref 11.5–14.5)
ERYTHROCYTE [DISTWIDTH] IN BLOOD BY AUTOMATED COUNT: 40.9 FL (ref 35–45)
GFR SERPL CREATININE-BSD FRML MDRD: 50 ML/MIN/1.73M2
GLUCOSE BLD-MCNC: 104 MG/DL (ref 70–108)
HCT VFR BLD CALC: 41.1 % (ref 37–47)
HEMOGLOBIN: 13.4 GM/DL (ref 12–16)
IMMATURE GRANS (ABS): 0.02 THOU/MM3 (ref 0–0.07)
IMMATURE GRANULOCYTES: 0.4 %
LYMPHOCYTES # BLD: 40 %
LYMPHOCYTES ABSOLUTE: 1.8 THOU/MM3 (ref 1–4.8)
MCH RBC QN AUTO: 31.2 PG (ref 26–33)
MCHC RBC AUTO-ENTMCNC: 32.6 GM/DL (ref 32.2–35.5)
MCV RBC AUTO: 95.6 FL (ref 81–99)
MONOCYTES # BLD: 9.8 %
MONOCYTES ABSOLUTE: 0.5 THOU/MM3 (ref 0.4–1.3)
NUCLEATED RED BLOOD CELLS: 0 /100 WBC
PLATELET # BLD: 287 THOU/MM3 (ref 130–400)
PMV BLD AUTO: 11.4 FL (ref 9.4–12.4)
POTASSIUM SERPL-SCNC: 4.2 MEQ/L (ref 3.5–5.2)
RBC # BLD: 4.3 MILL/MM3 (ref 4.2–5.4)
SEG NEUTROPHILS: 44.8 %
SEGMENTED NEUTROPHILS ABSOLUTE COUNT: 2.1 THOU/MM3 (ref 1.8–7.7)
SODIUM BLD-SCNC: 140 MEQ/L (ref 135–145)
TOTAL PROTEIN: 7.2 G/DL (ref 6.1–8)
WBC # BLD: 4.6 THOU/MM3 (ref 4.8–10.8)

## 2021-01-12 PROCEDURE — 36415 COLL VENOUS BLD VENIPUNCTURE: CPT

## 2021-01-12 PROCEDURE — 85025 COMPLETE CBC W/AUTO DIFF WBC: CPT

## 2021-01-12 PROCEDURE — 80053 COMPREHEN METABOLIC PANEL: CPT

## 2021-01-12 PROCEDURE — 82248 BILIRUBIN DIRECT: CPT

## 2021-01-21 ENCOUNTER — OFFICE VISIT (OUTPATIENT)
Dept: FAMILY MEDICINE CLINIC | Age: 66
End: 2021-01-21
Payer: MEDICARE

## 2021-01-21 VITALS
HEART RATE: 68 BPM | WEIGHT: 197.6 LBS | HEIGHT: 67 IN | BODY MASS INDEX: 31.01 KG/M2 | SYSTOLIC BLOOD PRESSURE: 124 MMHG | DIASTOLIC BLOOD PRESSURE: 72 MMHG | TEMPERATURE: 97.9 F

## 2021-01-21 DIAGNOSIS — N36.8 URETHRAL PROLAPSE: ICD-10-CM

## 2021-01-21 DIAGNOSIS — L29.9 ITCHING OF EAR: ICD-10-CM

## 2021-01-21 DIAGNOSIS — K21.9 GASTROESOPHAGEAL REFLUX DISEASE WITHOUT ESOPHAGITIS: ICD-10-CM

## 2021-01-21 DIAGNOSIS — E78.1 PURE HYPERGLYCERIDEMIA: Chronic | ICD-10-CM

## 2021-01-21 DIAGNOSIS — R09.82 POST-NASAL DRAINAGE: ICD-10-CM

## 2021-01-21 DIAGNOSIS — M77.8 LEFT WRIST TENDONITIS: ICD-10-CM

## 2021-01-21 DIAGNOSIS — R00.2 PALPITATIONS: ICD-10-CM

## 2021-01-21 DIAGNOSIS — D17.22 LIPOMA OF LEFT UPPER EXTREMITY: ICD-10-CM

## 2021-01-21 DIAGNOSIS — R35.0 URINARY FREQUENCY: Primary | ICD-10-CM

## 2021-01-21 DIAGNOSIS — H01.134 ECZEMATOUS DERMATITIS OF LEFT UPPER EYELID: ICD-10-CM

## 2021-01-21 LAB
BILIRUBIN, POC: NEGATIVE
BLOOD URINE, POC: NEGATIVE
CLARITY, POC: ABNORMAL
COLOR, POC: YELLOW
GLUCOSE URINE, POC: NEGATIVE
KETONES, POC: NEGATIVE
LEUKOCYTE EST, POC: ABNORMAL
NITRITE, POC: NEGATIVE
PH, POC: 6.5
PROTEIN, POC: NEGATIVE
SPECIFIC GRAVITY, POC: >=1.03
UROBILINOGEN, POC: ABNORMAL

## 2021-01-21 PROCEDURE — 99214 OFFICE O/P EST MOD 30 MIN: CPT | Performed by: FAMILY MEDICINE

## 2021-01-21 PROCEDURE — 81003 URINALYSIS AUTO W/O SCOPE: CPT | Performed by: FAMILY MEDICINE

## 2021-01-21 PROCEDURE — G8510 SCR DEP NEG, NO PLAN REQD: HCPCS | Performed by: FAMILY MEDICINE

## 2021-01-21 RX ORDER — PANTOPRAZOLE SODIUM 40 MG/1
TABLET, DELAYED RELEASE ORAL
Qty: 90 TABLET | Refills: 3 | Status: SHIPPED | OUTPATIENT
Start: 2021-01-21 | End: 2022-01-17

## 2021-01-21 RX ORDER — CEPHALEXIN 500 MG/1
500 CAPSULE ORAL 3 TIMES DAILY
Qty: 15 CAPSULE | Refills: 0 | Status: SHIPPED | OUTPATIENT
Start: 2021-01-21 | End: 2021-01-26

## 2021-01-21 RX ORDER — FENOFIBRATE 145 MG/1
TABLET, COATED ORAL
Qty: 90 TABLET | Refills: 3 | Status: SHIPPED | OUTPATIENT
Start: 2021-01-21 | End: 2022-01-17

## 2021-01-21 RX ORDER — ACETAMINOPHEN 160 MG
TABLET,DISINTEGRATING ORAL
COMMUNITY
End: 2021-09-14 | Stop reason: ALTCHOICE

## 2021-01-21 RX ORDER — ACETIC ACID 20.65 MG/ML
4 SOLUTION AURICULAR (OTIC) 3 TIMES DAILY
Qty: 15 ML | Refills: 2 | Status: SHIPPED | OUTPATIENT
Start: 2021-01-21 | End: 2021-01-28

## 2021-01-21 ASSESSMENT — PATIENT HEALTH QUESTIONNAIRE - PHQ9
SUM OF ALL RESPONSES TO PHQ QUESTIONS 1-9: 0
1. LITTLE INTEREST OR PLEASURE IN DOING THINGS: 0
2. FEELING DOWN, DEPRESSED OR HOPELESS: 0

## 2021-01-21 NOTE — PROGRESS NOTES
Disp: , Rfl:     pantoprazole (PROTONIX) 40 MG tablet, TAKE 1 TABLET DAILY, Disp: 90 tablet, Rfl: 3    fenofibrate (TRICOR) 145 MG tablet, TAKE 1 TABLET DAILY, Disp: 90 tablet, Rfl: 3    zinc gluconate 50 MG tablet, Take 50 mg by mouth daily, Disp: , Rfl:     vitamin B-12 (CYANOCOBALAMIN) 1000 MCG tablet, Take 1,000 mcg by mouth daily, Disp: , Rfl:     Ascorbic Acid (VITAMIN C) 500 MG CAPS, Take 1 tablet by mouth daily, Disp: , Rfl:     Aspirin-Acetaminophen-Caffeine (EXCEDRIN EXTRA STRENGTH PO), Take by mouth daily as needed, Disp: , Rfl:     Biotin 5000 MCG TABS, Take 5,000 mcg by mouth daily , Disp: , Rfl:     No Known Allergies    Review of Systems   Constitutional: Negative for fatigue and fever. Respiratory: Negative for shortness of breath. Cardiovascular: Negative for chest pain and leg swelling. as per HPI    Objective:     /72   Pulse 68   Temp 97.9 °F (36.6 °C) (Temporal)   Ht 5' 7\" (1.702 m)   Wt 197 lb 9.6 oz (89.6 kg)   BMI 30.95 kg/m²     Physical Exam  Constitutional:       General: She is not in acute distress. Appearance: Normal appearance. She is not ill-appearing. HENT:      Head: Normocephalic. Right Ear: Tympanic membrane, ear canal and external ear normal.      Left Ear: Tympanic membrane, ear canal and external ear normal.      Nose: Congestion (mild on left) present. Mouth/Throat:      Mouth: Mucous membranes are moist.      Pharynx: No posterior oropharyngeal erythema. Eyes:      General: No scleral icterus. Right eye: No discharge. Left eye: No discharge. Conjunctiva/sclera: Conjunctivae normal.      Comments: Eyelid inspection reveals a slight dry spot, irregular and only a 2-3 mm, that feels flat currently but has a small telangectasia running somewhat vertically. Cardiovascular:      Rate and Rhythm: Normal rate and regular rhythm. Heart sounds: No murmur.    Pulmonary:      Effort: Pulmonary effort is normal. No respiratory distress. Breath sounds: Normal breath sounds. No wheezing. Abdominal:      General: Abdomen is flat. There is no distension. Palpations: Abdomen is soft. Tenderness: There is no abdominal tenderness. Genitourinary:     Vagina: No vaginal discharge. Comments: Prolapsed upper vaginal wall and bladder. No cervix  Musculoskeletal:         General: No swelling. Comments: No elbow or wrist bony tenderness on left arm. Overall good ROM w/o sensory deficits of left arm, wrist/hand. Skin:     Comments: Left forearm with small semisoft, moveable lump under skin. Neurological:      Mental Status: She is alert and oriented to person, place, and time. Psychiatric:         Mood and Affect: Mood normal.         Assessment/Plan:     Shiraz Spencer was seen today for gynecologic exam and urinary tract infection. Diagnoses and all orders for this visit:    Urinary frequency  -     POCT Urinalysis No Micro (Auto)  -     Culture, Urine    Gastroesophageal reflux disease without esophagitis  -     pantoprazole (PROTONIX) 40 MG tablet; TAKE 1 TABLET DAILY    Pure hyperglyceridemia  -     fenofibrate (TRICOR) 145 MG tablet; TAKE 1 TABLET DAILY    Cervical cancer screening    Palpitations  -     TSH With Reflex Ft4; Future    Lipoma of left upper extremity    Left wrist tendonitis    Post-nasal drainage      Meds refilled  Urology referral offered. She would like to wait for now. Urine culture sent. Palpitations seem benign. Encouraged steady exercise and deep breathing. Discussed reasons for re-evaluation. Will check TSH as not done recently. Discussed benign lipomas and reasons for re-eval. She is reassured. Upper body including left wrist would benefit from rayshawn stretching and toning to handle activity level desired. Exercises given. If pain worsens or persists, consider imaging and PT. Will try acetic acid w/o HC -- ears look okay however. Eyelid situation is benign.   Discussed skin care. Discussed no need for pap due to not having cervix. Pelvic exam may be done if she has symptoms. Return in about 6 months (around 7/21/2021). No future appointments. This office note has been dictated. Effort was made to review for errors but some may have been missed. Please contact Tara Condon of note for clarification if needed.        Electronically signed by Justyna Avila MD on 1/21/2021 at 11:58 AM

## 2021-01-21 NOTE — PATIENT INSTRUCTIONS
Print out lipid testing. She will add thyroid testing. May try claritin 10 mg daily for post nasal drainage. Consider flonase 1 spray each nostril. Stay hydrated. Consider humidifier at night.

## 2021-01-24 PROBLEM — R09.82 POST-NASAL DRAINAGE: Status: ACTIVE | Noted: 2021-01-24

## 2021-01-24 PROBLEM — N36.8 URETHRAL PROLAPSE: Status: ACTIVE | Noted: 2021-01-24

## 2021-01-24 PROBLEM — M77.8 LEFT WRIST TENDONITIS: Status: ACTIVE | Noted: 2021-01-24

## 2021-01-24 PROBLEM — R00.2 PALPITATIONS: Status: ACTIVE | Noted: 2021-01-24

## 2021-01-24 PROBLEM — D17.22 LIPOMA OF LEFT UPPER EXTREMITY: Status: ACTIVE | Noted: 2021-01-24

## 2021-01-24 PROBLEM — L29.9 ITCHING OF EAR: Status: ACTIVE | Noted: 2021-01-24

## 2021-01-24 LAB
ORGANISM: ABNORMAL
URINE CULTURE, ROUTINE: ABNORMAL

## 2021-01-24 ASSESSMENT — ENCOUNTER SYMPTOMS: SHORTNESS OF BREATH: 0

## 2021-01-25 ENCOUNTER — TELEPHONE (OUTPATIENT)
Dept: FAMILY MEDICINE CLINIC | Age: 66
End: 2021-01-25

## 2021-01-25 RX ORDER — NITROFURANTOIN 25; 75 MG/1; MG/1
100 CAPSULE ORAL 2 TIMES DAILY
Qty: 14 CAPSULE | Refills: 0 | Status: SHIPPED | OUTPATIENT
Start: 2021-01-25 | End: 2021-02-01

## 2021-01-25 RX ORDER — NITROFURANTOIN 25; 75 MG/1; MG/1
100 CAPSULE ORAL 2 TIMES DAILY
Qty: 10 CAPSULE | Refills: 0 | Status: SHIPPED | OUTPATIENT
Start: 2021-01-25 | End: 2021-01-25

## 2021-01-25 NOTE — RESULT ENCOUNTER NOTE
Please let patient know that urine culture grew enterococcus faecalis which will not respond well to Keflex. I have sent macrobid to the pharmacy which she will need to ; treatment for 7 days. Stop keflex. Thank you.

## 2021-01-25 NOTE — TELEPHONE ENCOUNTER
----- Message from Dana Lindsey MD sent at 1/25/2021  1:05 PM EST -----  Please let patient know that urine culture grew enterococcus faecalis which will not respond well to Keflex. I have sent macrobid to the pharmacy which she will need to ; treatment for 7 days. Stop keflex. Thank you.

## 2021-01-28 ENCOUNTER — HOSPITAL ENCOUNTER (OUTPATIENT)
Age: 66
Discharge: HOME | End: 2021-01-28
Payer: MEDICARE

## 2021-01-28 ENCOUNTER — TELEPHONE (OUTPATIENT)
Dept: FAMILY MEDICINE CLINIC | Age: 66
End: 2021-01-28

## 2021-01-28 DIAGNOSIS — E78.2 MIXED HYPERLIPIDEMIA: Chronic | ICD-10-CM

## 2021-01-28 DIAGNOSIS — R00.2 PALPITATIONS: ICD-10-CM

## 2021-01-28 LAB
CHOLESTEROL, TOTAL: 165 MG/DL (ref 100–199)
HDLC SERPL-MCNC: 56 MG/DL
LDL CHOLESTEROL CALCULATED: 89 MG/DL
TRIGL SERPL-MCNC: 99 MG/DL (ref 0–199)
TSH SERPL DL<=0.05 MIU/L-ACNC: 1.87 UIU/ML (ref 0.4–4.2)

## 2021-01-28 PROCEDURE — 84443 ASSAY THYROID STIM HORMONE: CPT

## 2021-01-28 PROCEDURE — 80061 LIPID PANEL: CPT

## 2021-01-28 PROCEDURE — 36415 COLL VENOUS BLD VENIPUNCTURE: CPT

## 2021-01-28 NOTE — TELEPHONE ENCOUNTER
Spoke with the patient regarding the antibody test  Patient already had the labs drawn this am and would need redrawn for the Covid antibodies due to not the right tube for the test  Patient doesn't want to be redrawn so she is ok with not doing the test now

## 2021-01-28 NOTE — TELEPHONE ENCOUNTER
Amelie Morton calls and she would like the Antibody test for COVID done since she had her blood drawn this AM.

## 2021-03-01 ENCOUNTER — TELEPHONE (OUTPATIENT)
Dept: FAMILY MEDICINE CLINIC | Age: 66
End: 2021-03-01

## 2021-03-01 NOTE — TELEPHONE ENCOUNTER
Patient called [phone]   regarding her symptoms of URI frequency, urgency  and burning. She states she had a urologist in the past Dr Maddie Bedolla. She had her bladder tied up and she feels like that needs done again. she is wondering if that is why the repeated UTIs please advise

## 2021-03-01 NOTE — TELEPHONE ENCOUNTER
[facility] received a call from:    Name of Caller: Hardik Lund    Relationship to patient: self    Organization name: none    Best contact number: [phone]    Reason for call: Maricarmen Bermudez states she has another UTI and would like medication prescribed for her to clear it.  Offered to make appointment, but patient wanted message sent first.

## 2021-03-02 ENCOUNTER — OFFICE VISIT (OUTPATIENT)
Dept: FAMILY MEDICINE CLINIC | Age: 66
End: 2021-03-02
Payer: MEDICARE

## 2021-03-02 VITALS
WEIGHT: 198 LBS | BODY MASS INDEX: 31.01 KG/M2 | DIASTOLIC BLOOD PRESSURE: 68 MMHG | SYSTOLIC BLOOD PRESSURE: 128 MMHG | OXYGEN SATURATION: 98 % | HEART RATE: 73 BPM

## 2021-03-02 DIAGNOSIS — N36.8 URETHRAL PROLAPSE: ICD-10-CM

## 2021-03-02 DIAGNOSIS — R35.0 URINARY FREQUENCY: ICD-10-CM

## 2021-03-02 DIAGNOSIS — M62.89 PELVIC FLOOR DYSFUNCTION: ICD-10-CM

## 2021-03-02 DIAGNOSIS — N30.00 ACUTE CYSTITIS WITHOUT HEMATURIA: Primary | ICD-10-CM

## 2021-03-02 LAB
BILIRUBIN, POC: NEGATIVE
BLOOD URINE, POC: NORMAL
CLARITY, POC: NORMAL
COLOR, POC: YELLOW
GLUCOSE URINE, POC: NEGATIVE
KETONES, POC: NEGATIVE
LEUKOCYTE EST, POC: NORMAL
NITRITE, POC: NEGATIVE
PH, POC: 5.5
PROTEIN, POC: NEGATIVE
SPECIFIC GRAVITY, POC: 1020
UROBILINOGEN, POC: 0.2

## 2021-03-02 PROCEDURE — 81002 URINALYSIS NONAUTO W/O SCOPE: CPT | Performed by: FAMILY MEDICINE

## 2021-03-02 PROCEDURE — 99213 OFFICE O/P EST LOW 20 MIN: CPT | Performed by: FAMILY MEDICINE

## 2021-03-02 RX ORDER — NITROFURANTOIN 25; 75 MG/1; MG/1
100 CAPSULE ORAL 2 TIMES DAILY
Qty: 10 CAPSULE | Refills: 0 | Status: SHIPPED | OUTPATIENT
Start: 2021-03-02 | End: 2021-03-07

## 2021-03-02 ASSESSMENT — ENCOUNTER SYMPTOMS
CONSTIPATION: 1
VOMITING: 0
NAUSEA: 0

## 2021-03-02 NOTE — PROGRESS NOTES
[address] [facility], [address], [place]  Phone:  [phone]  FUN:[phone]       Name: Zandra Parsons  : 1955    Chief Complaint   Patient presents with    Urinary Tract Infection     \"tingling\"    Urinary Frequency       HPI:     Zandra Parsons is a 76 y.o. female who presents today for     HPI    UTi symptoms again. 3 days. 2020 and 2021 + urine cultures.  had gyn procedure. 2021 visit  exam was consistent with urethral prolapse/relaxation of upper vaginal wall. Urinary frequency present all the time. Has done kegels before. Would like to avoid procedure. Admits to Off and on constipation      Current Outpatient Medications:     nitrofurantoin, macrocrystal-monohydrate, (MACROBID) 100 MG capsule, Take 1 capsule by mouth 2 times daily for 5 days, Disp: 10 capsule, Rfl: 0    Cholecalciferol (VITAMIN D3) 50 MCG (2000 UT) CAPS, Take by mouth, Disp: , Rfl:     pantoprazole (PROTONIX) 40 MG tablet, TAKE 1 TABLET DAILY, Disp: 90 tablet, Rfl: 3    fenofibrate (TRICOR) 145 MG tablet, TAKE 1 TABLET DAILY, Disp: 90 tablet, Rfl: 3    zinc gluconate 50 MG tablet, Take 50 mg by mouth daily, Disp: , Rfl:     vitamin B-12 (CYANOCOBALAMIN) 1000 MCG tablet, Take 1,000 mcg by mouth daily, Disp: , Rfl:     Ascorbic Acid (VITAMIN C) 500 MG CAPS, Take 1 tablet by mouth daily, Disp: , Rfl:     Aspirin-Acetaminophen-Caffeine (EXCEDRIN EXTRA STRENGTH PO), Take by mouth daily as needed, Disp: , Rfl:     Biotin 5000 MCG TABS, Take 5,000 mcg by mouth daily , Disp: , Rfl:     No Known Allergies    Review of Systems   Constitutional: Negative for activity change, appetite change, fatigue and fever. Gastrointestinal: Positive for constipation. Negative for nausea and vomiting. Genitourinary: Positive for frequency and urgency. Negative for decreased urine volume, difficulty urinating, hematuria and vaginal pain.        Objective:     /68   Pulse 73   Wt 198 lb (89.8 kg)   SpO2 98%   BMI 31.01 kg/m²     Physical Exam  Gen: NAD, AAO x 3, coherent, pleasant  UA here with trace blood and mod-large Leuk    Assessment/Plan:     Chioma Patel was seen today for urinary tract infection and urinary frequency. Diagnoses and all orders for this visit:    Acute cystitis without hematuria  -     nitrofurantoin, macrocrystal-monohydrate, (MACROBID) 100 MG capsule; Take 1 capsule by mouth 2 times daily for 5 days    Urinary frequency  -     Cancel: External Referral To Physical Therapy  -     [facility]    Urethral prolapse  -     Cancel: External Referral To Physical Therapy  -     [facility]    Pelvic floor dysfunction  -     Cancel: External Referral To Physical Therapy  -     [facility]    cover for UTI -- urine culture to follow  Okay for cranberry tablets  Add probiotic to help with bowels. Good fluid intake and fiber to help bowels. Will consider abx for 3 months +/- estrogen therapy. If above fails, urology referral.    Return if symptoms worsen or fail to improve. Future Appointments   Date Time Provider Sonya Mueller   7/22/2021 11:00 AM Joy Ceballos  [facility]          This office note has been dictated. Effort was made to review for errors but some may have been missed. Please contact Ant Mejia of note for clarification if needed.        Electronically signed by Joy Ceballos MD on 3/2/2021 at 1:00 PM

## 2021-03-02 NOTE — PATIENT INSTRUCTIONS
Probiotic to start -- aim for 2 billion to 5 billion CFU (colony forming units) daily, multi-strain. Separate probiotic from antibiotic by 2 hours. -- consider Kefir start 1/2 cup daily and increase to 1 cup after a week or so. -- consider adding flax seed meal 1 tsp and titrate up to 1 Tbsp    If urine culture is positive, we'll do 3 month of antibiotic  If negative, then recommend estrogen therapy for 1 month. Okay to use cranberry tablets as needed for urinary tract systems.

## 2021-03-02 NOTE — TELEPHONE ENCOUNTER
I sent a [employer] message but she may be expecting a phone call. I asked how many days she has been having UTI symptoms. To establish true recurrence of UTI, an UA with culture if indicated is ideal.    Urological surgical issue is possible.   Unable to confirm w/o exam.

## 2021-03-03 ENCOUNTER — HOSPITAL ENCOUNTER (OUTPATIENT)
Dept: PHYSICAL THERAPY | Age: 66
Setting detail: THERAPIES SERIES
Discharge: HOME | End: 2021-03-03
Payer: MEDICARE

## 2021-03-03 PROCEDURE — 97530 THERAPEUTIC ACTIVITIES: CPT

## 2021-03-03 PROCEDURE — 97161 PT EVAL LOW COMPLEX 20 MIN: CPT

## 2021-03-03 NOTE — FLOWSHEET NOTE
this is failing. She is currently on an antibiotic for UTI, and her symptoms include frequency and urgency. She does describe urgency even without a UTI. This condition has persisted for years. SUBJECTIVE: See above. Social/Functional History and Current Status:  Medications and Allergies have been reviewed and are listed on Medical History Questionnaire. Zandra Parsons lives with spouse in a multiple floor home with a level surface to enter. 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 Retired teacher. 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 PAIN    Location Low pressure in the pelvic area. Description    Increased by    Decreased by    Maximum Intensity    Best Intensity    Todays Rating    Other/Function        History of Abuse:No history of physical, emotional or sexual abuse reported    SEXUAL /MENSTRUAL HISTORY [] Deferred secondary to:    Age of First Menstrual Period    Are Cycles Regular    Pain During Menses    Birth Control    Number of Pregnancies 3   Number of Vaginal Deliveries 3-last child was large, pt tore. Number of Caesarean Deliveries        BLADDER ASSESSMENT [] Deferred secondary to:   Daily Fluid Ingestion: 1 cup coffee, 6 bottles of water per day. Urination Frequency Times/Day: 12-16x (but suspected UTI at this time),  7-10 x normally  Times/Night: 5-6 x  (once when she does not have UTI)   Volume Occasionally less than expected. Urge Sensation Severe   SYMPTOM QUESTIONNAIRE   Loses Urine Upon: None on cough and sneeze. Leaks with urge, whole bladder when she has a UTI.      Incontinence Volume: Varies   Frequency of Leakage: Daily    Wets the Bed: Occasionally. Burning/Pain with Urination: Yes with UTI, tingling. Difficulty Starting a Stream of Urine: No   Incomplete Emptying Pt denies sensation. Strain to Empty Bladder: Strains to empty, not much comes out.  -Rocking discussed. Falling Out Feeling: None, pressure only. Urinate more than 7 times/day: Yes   Use a form of Leakage Protection: Not normally, but with UTI   Restrict Fluid Intake: None   Stream Strength Good       BOWEL ASSESSMENT [] Deferred secondary to:   Frequency: Tend towards constipation. Every few days. Most Common Stool Consistency: Pellets, knobby. SYMPTOM QUESTIONNAIRE   Strain to have a BM: Yes   Include fiber in your diet: None   Take laxatives/enemas regularly: None   Pain with BM: Sometimes, not often   Strong urge to have BM: None   Leak/Stain Feces: None   Diarrhea often: None           GENERAL ASSESSMENT   [] Deferred secondary to:   Palpation    Observation    Posture Small stature, No gross abnormality. Range of Motion NL   Strength LE strength WFL, core strength 2/5 of the lower aspect   Gait NL   Sensation Intact   Edema None   Balance/Fall History NA   Special Tests Mild loss of flexibility in the hip girdle. OBSERVATION  [] Deferred secondary to:   Patient Safety Chaperone offered and declined. The pt did verbalize consent for internal vaginal examination following PT education of pt on the purpose of this activity and on the procedure using the model. Pt was educated in the intent of the PT to proceed slowly into the vaginal canal with permission requested of pt at every step of assessment prior to commencement by PT. Pt was also educated in her right to stop assessment, refuse any portion of this assessment, or to refuse assessment at any time without need for reason.   The pt was educated that refusal would not impact her ability to seek care from this therapist in any way or result in therapist prejudice regarding her motivation to get better. Pt verbalized understanding and agreed again to internal examination by PT this date. Skin Condition External intact. Urogenital Triangle    Introitus     Perineal Descent     External Clock         PELVIC FLOOR INTERNAL EXAM [] Deferred secondary to:   Exam Vaginal    Sensation Intact   Muscle Localization Very good in spite of profound weakness   Palpation/Tone NL   Pelvic Floor Strength 1/3/10/10   Relax after Contraction NL   Prolapse Cystocele. PELVIC HEALTH INCONTINENCE TREATMENT   Precautions:    Pain:     X in shaded column indicates activity completed today   Exercise/Intervention Reps/Sec  Notes   PF A and P and viscera  10' x    Normal bladder  5' x Pee symphony, weak/small bladder myth, bladder habits (no JIC) covered. Rocking on toilet as well. Diet impact on bladder       Urge control/Urge drills       Constipation education              Kegal quick  5' x    Kegal hold  5' x    Tummy tuck quick/hold       Pelvic Brace Quick       Pelvic Brace Hold              OI stretch       PFM stretch       Piriformis stretch       HS stretch       Pako stretch       Adductor stretch                     Kegal with [place] with Band                     Pelvic Tilt       Pelvic Tilt with arm lift       Pelvic Tilt with leg march       Pelvic Tilt with opposites       Bridge       Pelvic Tilt SLR       Clamshell       Reverse Clamshell                     Standing Kegal       Kegal Mini Squat       Standing Hip 3-way                         Specific Interventions Next Treatment: Strengthening and complete education. Activity/Treatment Tolerance:  []  Patient tolerated treatment well  []  Patient limited by fatigue  []  Patient limited by pain   []  Patient limited by medical complications  []  Other:     Patient Education:    [x]  HEP/Education Completed:  This pt was educated in pelvic floor muscle and pelvic viscera anatomy and physiology, using the model, to increase insight into condition. The pt was also educated in a basic Kegel program of quick and hold contractions done alternately every two hours during waking hours. Pt cautioned against overworking the muscles which will lead to worsening of incontinence. Also completed above education per flow sheet. Adherence to HEP was strenuously suggested for most rapid improvement in symptoms. PT verbalized understanding of all.        [employer] Access Code:  []  No new Education completed  []  Reviewed Prior HEP      []  Patient verbalized and/or demonstrated understanding of education provided. []  Patient unable to verbalize and/or demonstrate understanding of education provided. Will continue education. []  Barriers to learning:     Assessment: This pt presents to therapy with complaints of pelvic pressure, urgency, frequency, urge incontinence, and constipation which is likely a contributor to her urinary symptoms given their nature. Upon examination she demos profound core weakness, profound PFM weakness and loss of endurance, and has developed compensatory techniques to deal with her symptoms that are directly worsening her condition. She requires skilled PT to address these impairments in order to promote increased comfort with being in public and decreasing her risk of UTI. Body Structures/Functions/Activity Limitations: PFM weakness with decreased localization and endurance, Decreased awareness of functional factors that increase pelvic organ strain, Decreased awareness of normal bladder habits, control, and diet effects on functioning, Abdominal and core weakness, Impaired balance, Impaired coordination, Impaired muscle tone and Impaired strength  Prognosis: Good    GOALS:  Patient Goal: To reduce pressure and increase bladder control, reduce frequency. Short Term Goals: 6 weeks  1.  Pt will be able to identify normal bladder function/voiding patterns, diet impact on the bladder, and urge control strategies to enhance pt insight into potential causative factors and promote increased bladder control. 2. The pt will demonstrate well localized PFM contraction in order to increase the efficacy of strengthening program.   3. Pt will demonstrate the ability to delay urgency for 5' with good control to enable time to get to the bathroom. 4. Pt will demonstrate independence with basic HEP designed to increase PFM and core strength and to enhance hip girdle flexibility for increased ease of strengthening. 5.  This pt will reduce nocturia to 3 times per night to establish more restful sleep patterns. 6.  This pt will demo proper use of pelvic brace lifting, push, and pull with pelvic brace in order to promote continence during functional tasks. Long Term Goals: 12 weeks  1. This pt will report a 40% decrease in incontinence frequency and a 40% decrease in amount to increase comfort in public and reduce risk of integumentary compromise. 2. This pt will report normal urinary frequency to enabling ease of work/home task completion without interruption. 3. This pt will decrease IIQ and IUD scores to 6 respectively to indicate improved continence and efficacy of treatment  4. This pt will demo independence with advanced HEP in order to allow gains in therapy to be maintained long term and reduce the risk of further medical need/assessment. 5. This pt will demo PFM PERF 3/10/10/10 in order to increase continence in functional positions during home tasks. 6. This pt will demo 3/5 strength of the core with habitual bottom to top contraction in order to increase pelvic organ support during cough/sneeze/lift and to promote continence via effective pelvic brace. 7. The pt will abolish nocturia in order to attain restful sleep patterns.       PLAN:  Treatment Recommendations: Strengthening, Range of Motion, Balance Training, Functional Mobility Training, Gait Training, Neuromuscular Re-education, Manual Therapy - Soft Tissue Mobilization, Manual Therapy - Joint Manipulation, Home Exercise Program, Patient Education and Modalities    [x]  Plan of care initiated. Plan to see patient 1 times every other week for 12 weeks to address the treatment planned outlined above.   []  Continue with current plan of care  []  Modify plan of care as follows:    []  Hold pending physician visit  []  Discharge    Time In 1100   Time Out 1200   Timed Code Minutes: 25   Total Treatment Time: 60       Electronically Signed by: Ana Pritchett

## 2021-03-04 LAB
ORGANISM: ABNORMAL
URINE CULTURE, ROUTINE: ABNORMAL

## 2021-03-11 ENCOUNTER — TELEPHONE (OUTPATIENT)
Dept: FAMILY MEDICINE CLINIC | Age: 66
End: 2021-03-11

## 2021-03-12 ENCOUNTER — PATIENT MESSAGE (OUTPATIENT)
Dept: FAMILY MEDICINE CLINIC | Age: 66
End: 2021-03-12

## 2021-03-16 ENCOUNTER — NURSE ONLY (OUTPATIENT)
Dept: FAMILY MEDICINE CLINIC | Age: 66
End: 2021-03-16

## 2021-03-16 ENCOUNTER — TELEPHONE (OUTPATIENT)
Dept: FAMILY MEDICINE CLINIC | Age: 66
End: 2021-03-16

## 2021-03-16 DIAGNOSIS — N39.0 RECURRENT UTI: Primary | ICD-10-CM

## 2021-03-16 NOTE — TELEPHONE ENCOUNTER
Zenon Kang phoned- states that she is still having UTI symptoms of urgency, frequency and burning. Finished Nitrofurantoin 03/07/21. Wanting to know if she should have a repeat UA, office visit or another antibiotic?  Please advise

## 2021-03-16 NOTE — TELEPHONE ENCOUNTER
From: J Luis Kothari MD  To: Arabella Kang  Sent: 3/12/2021 6:44 AM EST  Subject: urinary frequency    Staff let me know that you are feeling better since antibiotic but still having some frequency. The antibiotic nitrofurantoin should cover infection. So, I recommend for now focusing on pelvic floor therapy, but if frequency is bothersome, we can recheck urine and also consider another medication to calm down sensation of frequency as it may be a nerve irritation related to overactive bladder. I certainly hope that therapy can improve these symptoms and improve bladder function.

## 2021-03-17 ENCOUNTER — HOSPITAL ENCOUNTER (OUTPATIENT)
Dept: PHYSICAL THERAPY | Age: 66
Setting detail: THERAPIES SERIES
Discharge: HOME | End: 2021-03-17
Payer: MEDICARE

## 2021-03-17 DIAGNOSIS — N30.00 ACUTE CYSTITIS WITHOUT HEMATURIA: Primary | ICD-10-CM

## 2021-03-17 LAB
BACTERIA: ABNORMAL /HPF
BILIRUBIN URINE: NEGATIVE
BLOOD, URINE: ABNORMAL
CASTS 2: ABNORMAL /LPF
CASTS UA: ABNORMAL /LPF
CHARACTER, URINE: ABNORMAL
COLOR: YELLOW
CRYSTALS, UA: ABNORMAL
EPITHELIAL CELLS, UA: ABNORMAL /HPF
GLUCOSE URINE: NEGATIVE MG/DL
KETONES, URINE: NEGATIVE
LEUKOCYTE ESTERASE, URINE: ABNORMAL
MISCELLANEOUS 2: ABNORMAL
NITRITE, URINE: NEGATIVE
PH UA: 8 (ref 5–9)
PROTEIN UA: NEGATIVE
RBC URINE: ABNORMAL /HPF
RENAL EPITHELIAL, UA: ABNORMAL
SPECIFIC GRAVITY, URINE: 1.02 (ref 1–1.03)
UROBILINOGEN, URINE: 0.2 EU/DL (ref 0–1)
WBC UA: > 200 /HPF
YEAST: ABNORMAL

## 2021-03-17 PROCEDURE — 97110 THERAPEUTIC EXERCISES: CPT

## 2021-03-17 RX ORDER — CEPHALEXIN 500 MG/1
500 CAPSULE ORAL 3 TIMES DAILY
Qty: 21 CAPSULE | Refills: 0 | Status: SHIPPED | OUTPATIENT
Start: 2021-03-17 | End: 2021-07-23

## 2021-03-17 NOTE — PROGRESS NOTES
[facility]  PHYSICAL THERAPY  OUTPATIENT REHABILITATION - SPECIALIZED THERAPY SERVICES  [] PELVIC HEALTH EVALUATION  [x] DAILY NOTE  [] PROGRESS NOTE [] DISCHARGE NOTE    Date: 3/17/2021  Patient Name:  Alton Lester  : 1955  MRN: [id number]  [facility]: [id number]    Referring Practitioner River Sanders MD   Diagnosis Frequency of micturition [R35.0]    Treatment Diagnosis M62.58 - Muscle Wasting and Atrophy, nec, other site  M62.81 - Muscle Weakness (Generalized)  N39.41 - Urge Incontinence, R39.15 - Urgency of Urination  R35.1 - Nocturia  R35.0 - Frequency of Micturition  R39.16 - Straining to Void and K55.09 - Other Constipation  K59.00 - Constipation, Unspecified   Date of Evaluation 3/3/21    Additional Pertinent History Bladder suspension, hysterectomy. Functional Outcome Measure Used IIQ and MARTA   Functional Outcome Score 7 and 11 (3/3/21)       Insurance: Primary: Payor: Whitley Thompson /  /  / ,   Secondary:    Authorization Information:    Visit # 2, 2/10 for progress note   Visits Allowed: No pre cert, no visit limit   Recertification Date:    Physician Follow-Up:    Physician Orders:    History of Present Illness: The pt reports that she has UTI's, \"all the time\", and she suspects that her bladder is \"dropping\". She states she has a history of bladder suspension (Dec 2014) but she feel this is failing. She is currently on an antibiotic for UTI, and her symptoms include frequency and urgency. She does describe urgency even without a UTI. This condition has persisted for years. SUBJECTIVE: The pt states she feels she still has a UTI, her antibiotics are gone. She has given a urine sample to her doctor and has to wait 48 hours to get culture. She is very uncomfortable and has to void frequently including all night long. The pt states that she has been doing her kegels every two hours.       Social/Functional History and Current Status:  Medications and Allergies have been reviewed and are listed on Medical History Questionnaire. Daniel Smith lives with spouse in a multiple floor home with a level surface to enter. 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 Retired teacher. 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 PAIN    Location Low pressure in the pelvic area. Description    Increased by    Decreased by    Maximum Intensity    Best Intensity    Todays Rating    Other/Function        History of Abuse:No history of physical, emotional or sexual abuse reported    SEXUAL /MENSTRUAL HISTORY [] Deferred secondary to:    Age of First Menstrual Period    Are Cycles Regular    Pain During Menses    Birth Control    Number of Pregnancies 3   Number of Vaginal Deliveries 3-last child was large, pt tore. Number of Caesarean Deliveries        BLADDER ASSESSMENT [] Deferred secondary to:   Daily Fluid Ingestion: 1 cup coffee, 6 bottles of water per day. Urination Frequency Times/Day: 12-16x (but suspected UTI at this time),  7-10 x normally  Times/Night: 5-6 x  (once when she does not have UTI)   Volume Occasionally less than expected. Urge Sensation Severe   SYMPTOM QUESTIONNAIRE   Loses Urine Upon: None on cough and sneeze. Leaks with urge, whole bladder when she has a UTI. Incontinence Volume: Varies   Frequency of Leakage: Daily    Wets the Bed: Occasionally. Burning/Pain with Urination: Yes with UTI, tingling. Difficulty Starting a Stream of Urine: No   Incomplete Emptying Pt denies sensation. Strain to Empty Bladder: Strains to empty, not much comes out.  -Rocking discussed. Falling Out Feeling: None, pressure only.     Urinate more than 7 times/day: Yes   Use a form of Leakage Protection: Not normally, but with UTI   Restrict Fluid Intake: None   Stream Strength Good       BOWEL ASSESSMENT [] Deferred secondary to:   Frequency: Tend towards constipation. Every few days. Most Common Stool Consistency: Pellets, knobby. SYMPTOM QUESTIONNAIRE   Strain to have a BM: Yes   Include fiber in your diet: None   Take laxatives/enemas regularly: None   Pain with BM: Sometimes, not often   Strong urge to have BM: None   Leak/Stain Feces: None   Diarrhea often: None           GENERAL ASSESSMENT   [] Deferred secondary to:   Palpation    Observation    Posture Small stature, No gross abnormality. Range of Motion NL   Strength LE strength WFL, core strength 2/5 of the lower aspect   Gait NL   Sensation Intact   Edema None   Balance/Fall History NA   Special Tests Mild loss of flexibility in the hip girdle. OBSERVATION  [] Deferred secondary to:   Patient Safety Chaperone offered and declined. The pt did verbalize consent for internal vaginal examination following PT education of pt on the purpose of this activity and on the procedure using the model. Pt was educated in the intent of the PT to proceed slowly into the vaginal canal with permission requested of pt at every step of assessment prior to commencement by PT. Pt was also educated in her right to stop assessment, refuse any portion of this assessment, or to refuse assessment at any time without need for reason. The pt was educated that refusal would not impact her ability to seek care from this therapist in any way or result in therapist prejudice regarding her motivation to get better. Pt verbalized understanding and agreed again to internal examination by PT this date. Skin Condition External intact.     Urogenital Triangle    Introitus     Perineal Descent     External Clock         PELVIC FLOOR INTERNAL EXAM [] Deferred secondary to:   Exam Vaginal    Sensation Intact   Muscle Localization Very good in spite of profound weakness   Palpation/Tone NL   Pelvic Floor Strength 1/3/10/10   Relax after Contraction NL   Prolapse Cystocele. PELVIC HEALTH INCONTINENCE TREATMENT   Precautions:    Pain:     X in shaded column indicates activity completed today   Exercise/Intervention Reps/Sec  Notes   PF A and P and viscera  10'     Normal bladder  5'  Pee symphony, weak/small bladder myth, bladder habits (no JIC) covered. Rocking on toilet as well. Spread feet apart. Diet impact on bladder    Next time   Urge control/Urge drills    Next time   Constipation ed ucation    Next time          Kegal quick  5'     Kegal hold  5'     Tummy tuck quick/hold       Pelvic Brace Quick  10x10 x    Pelvic Brace Hold  3\" x           OI stretch  3 x 30\" x    PFM stretch  3 x 30\" x    Piriformis stretch  3 x 30\" B x    HS stretch       Pako stretch       Adductor stretch  3 x 30\" x                  Kegal with [place] with Band                     Pelvic Tilt       Pelvic Tilt with arm lift       Pelvic Tilt with leg march       Pelvic Tilt with opposites       Bridge       Pelvic Tilt SLR       Clamshell       Reverse Clamshell                     Standing Kegal       Kegal Mini Squat       Standing Hip 3-way                         Specific Interventions Next Treatment: Strengthening and complete education. Activity/Treatment Tolerance:  []  Patient tolerated treatment well  []  Patient limited by fatigue  []  Patient limited by pain   []  Patient limited by medical complications  []  Other:     Patient Education:    [x]  HEP/Education Completed: The pt was given handouts of all exercises and these were reviewed with the pt with notations added by PT to enhance pt understanding and ease of follow through at home.         ponUp Access Code:  []  No new Education completed  []  Reviewed Prior HEP      []  Patient verbalized and/or demonstrated understanding of education provided. []  Patient unable to verbalize and/or demonstrate understanding of education provided. Will continue education. []  Barriers to learning:     Assessment: This pt presents today stating that she has a bladder infection and she is not being treated yet as her urine is being cultured for best accurate antibiotic prescription. She has been diligent in her exercises however, in spite of infection, and tolerated today's treatment very well. We did discuss natural supplements that can aid in bladder infection prevention including D-Mannose and Marie Papa. She was given handouts to bring to her doctor and strongly cautioned against taking anything without her doctor's approval.  PT reminded pt that even natural supplements are drugs and can interact with meds she is already taking and hurt her. She verbalized understanding and agreed she would not take anything without consulting her doctor first.   She requires ongoing skilled PT to address remaining educational needs and strengthening to increase continence and lessen pressure. Body Structures/Functions/Activity Limitations: PFM weakness with decreased localization and endurance, Decreased awareness of functional factors that increase pelvic organ strain, Decreased awareness of normal bladder habits, control, and diet effects on functioning, Abdominal and core weakness, Impaired balance, Impaired coordination, Impaired muscle tone and Impaired strength  Prognosis: Good    GOALS:  Patient Goal: To reduce pressure and increase bladder control, reduce frequency. Short Term Goals: 6 weeks  1. Pt will be able to identify normal bladder function/voiding patterns, diet impact on the bladder, and urge control strategies to enhance pt insight into potential causative factors and promote increased bladder control.      2. The pt will demonstrate well localized PFM contraction in order to increase the efficacy of strengthening program.   3. Pt the treatment planned outlined above.   []  Continue with current plan of care  []  Modify plan of care as follows:    []  Hold pending physician visit  []  Discharge    Time In 1710   Time Out 1805   Timed Code Minutes: 55   Total Treatment Time: 55       Electronically Signed by: Ludwig Ludwig

## 2021-03-21 LAB
ORGANISM: ABNORMAL
URINE CULTURE REFLEX: ABNORMAL

## 2021-03-31 ENCOUNTER — HOSPITAL ENCOUNTER (OUTPATIENT)
Dept: PHYSICAL THERAPY | Age: 66
Setting detail: THERAPIES SERIES
Discharge: HOME | End: 2021-03-31
Payer: MEDICARE

## 2021-03-31 PROCEDURE — 97110 THERAPEUTIC EXERCISES: CPT

## 2021-03-31 NOTE — PROGRESS NOTES
[id number]  PHYSICAL THERAPY  OUTPATIENT REHABILITATION - SPECIALIZED THERAPY SERVICES  [] PELVIC HEALTH EVALUATION  [x] DAILY NOTE  [] PROGRESS NOTE [] DISCHARGE NOTE    Date: 3/31/2021  Patient Name:  Reddy Mcgregor  : 1955  MRN: [id number]  CSN: [id number]    Referring Practitioner Fito Johnson MD   Diagnosis Frequency of micturition [R35.0]    Treatment Diagnosis M62.58 - Muscle Wasting and Atrophy, nec, other site  M62.81 - Muscle Weakness (Generalized)  N39.41 - Urge Incontinence, R39.15 - Urgency of Urination  R35.1 - Nocturia  R35.0 - Frequency of Micturition  R39.16 - Straining to Void and K55.09 - Other Constipation  K59.00 - Constipation, Unspecified   Date of Evaluation 3/3/21    Additional Pertinent History Bladder suspension, hysterectomy. Functional Outcome Measure Used IIQ and MARTA   Functional Outcome Score 7 and 11 (3/3/21)       Insurance: Primary: Payor: Aishwarya Reyes /  /  / ,   Secondary:    Authorization Information:    Visit # 2, 2/10 for progress note   Visits Allowed: No pre cert, no visit limit   Recertification Date:    Physician Follow-Up:    Physician Orders:    History of Present Illness: The pt reports that she has UTI's, \"all the time\", and she suspects that her bladder is \"dropping\". She states she has a history of bladder suspension (Dec 2014) but she feel this is failing. She is currently on an antibiotic for UTI, and her symptoms include frequency and urgency. She does describe urgency even without a UTI. This condition has persisted for years. SUBJECTIVE: The pt states that she is not sure if her UTI is gone. She is not voiding as frequently, but she is considering confirmation via urinalysis that it is gone. She has not asked about d-mannose yet. The pt states that kegels are going well and she can do 10 3\" holds in a row. She is ready to increase her time in hold.        Social/Functional History and Current Status:  Medications and Allergies have been reviewed and are listed on Medical History Questionnaire. Tristan Decker lives with spouse in a multiple floor home with a level surface to enter. 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 Retired teacher. 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 PAIN    Location Low pressure in the pelvic area. Description    Increased by    Decreased by    Maximum Intensity    Best Intensity    Todays Rating    Other/Function        History of Abuse:No history of physical, emotional or sexual abuse reported    SEXUAL /MENSTRUAL HISTORY [] Deferred secondary to:    Age of First Menstrual Period    Are Cycles Regular    Pain During Menses    Birth Control    Number of Pregnancies 3   Number of Vaginal Deliveries 3-last child was large, pt tore. Number of Caesarean Deliveries        BLADDER ASSESSMENT [] Deferred secondary to:   Daily Fluid Ingestion: 1 cup coffee, 6 bottles of water per day. Urination Frequency Times/Day: 12-16x (but suspected UTI at this time),  7-10 x normally  Times/Night: 5-6 x  (once when she does not have UTI)   Volume Occasionally less than expected. Urge Sensation Severe   SYMPTOM QUESTIONNAIRE   Loses Urine Upon: None on cough and sneeze. Leaks with urge, whole bladder when she has a UTI. Incontinence Volume: Varies   Frequency of Leakage: Daily    Wets the Bed: Occasionally. Burning/Pain with Urination: Yes with UTI, tingling. Difficulty Starting a Stream of Urine: No   Incomplete Emptying Pt denies sensation. Strain to Empty Bladder: Strains to empty, not much comes out.  -Rocking discussed.      Falling Out Feeling: None, pressure only. Urinate more than 7 times/day: Yes   Use a form of Leakage Protection: Not normally, but with UTI   Restrict Fluid Intake: None   Stream Strength Good       BOWEL ASSESSMENT [] Deferred secondary to:   Frequency: Tend towards constipation. Every few days. Most Common Stool Consistency: Pellets, knobby. SYMPTOM QUESTIONNAIRE   Strain to have a BM: Yes   Include fiber in your diet: None   Take laxatives/enemas regularly: None   Pain with BM: Sometimes, not often   Strong urge to have BM: None   Leak/Stain Feces: None   Diarrhea often: None           GENERAL ASSESSMENT   [] Deferred secondary to:   Palpation    Observation    Posture Small stature, No gross abnormality. Range of Motion NL   Strength LE strength WFL, core strength 2/5 of the lower aspect   Gait NL   Sensation Intact   Edema None   Balance/Fall History NA   Special Tests Mild loss of flexibility in the hip girdle. OBSERVATION  [] Deferred secondary to:   Patient Safety Chaperone offered and declined. The pt did verbalize consent for internal vaginal examination following PT education of pt on the purpose of this activity and on the procedure using the model. Pt was educated in the intent of the PT to proceed slowly into the vaginal canal with permission requested of pt at every step of assessment prior to commencement by PT. Pt was also educated in her right to stop assessment, refuse any portion of this assessment, or to refuse assessment at any time without need for reason. The pt was educated that refusal would not impact her ability to seek care from this therapist in any way or result in therapist prejudice regarding her motivation to get better. Pt verbalized understanding and agreed again to internal examination by PT this date. Skin Condition External intact.     Urogenital Triangle    Introitus     Perineal Descent     External Clock         PELVIC FLOOR INTERNAL EXAM [] Deferred secondary to: understanding of education provided. []  Patient unable to verbalize and/or demonstrate understanding of education provided. Will continue education. []  Barriers to learning:     Assessment: This pt continues to tolerate exercises very well. She was progressed in DLSP and found this very challenging due to lower core weakness. Her form, however, with cues tacitly and visual improved to perfect. She tolerated treatment very well today and anticipate ongoing improvement as pt progresses in her program.       Body Structures/Functions/Activity Limitations: PFM weakness with decreased localization and endurance, Decreased awareness of functional factors that increase pelvic organ strain, Decreased awareness of normal bladder habits, control, and diet effects on functioning, Abdominal and core weakness, Impaired balance, Impaired coordination, Impaired muscle tone and Impaired strength  Prognosis: Good    GOALS:  Patient Goal: To reduce pressure and increase bladder control, reduce frequency. Short Term Goals: 6 weeks  1. Pt will be able to identify normal bladder function/voiding patterns, diet impact on the bladder, and urge control strategies to enhance pt insight into potential causative factors and promote increased bladder control. 2. The pt will demonstrate well localized PFM contraction in order to increase the efficacy of strengthening program.   3. Pt will demonstrate the ability to delay urgency for 5' with good control to enable time to get to the bathroom. 4. Pt will demonstrate independence with basic HEP designed to increase PFM and core strength and to enhance hip girdle flexibility for increased ease of strengthening. 5.  This pt will reduce nocturia to 3 times per night to establish more restful sleep patterns. 6.  This pt will demo proper use of pelvic brace lifting, push, and pull with pelvic brace in order to promote continence during functional tasks.         Long Term Goals:

## 2021-04-14 ENCOUNTER — HOSPITAL ENCOUNTER (OUTPATIENT)
Dept: PHYSICAL THERAPY | Age: 66
Setting detail: THERAPIES SERIES
Discharge: HOME | End: 2021-04-14
Payer: MEDICARE

## 2021-04-14 PROCEDURE — 97110 THERAPEUTIC EXERCISES: CPT

## 2021-04-14 NOTE — PROGRESS NOTES
Leakage Protection: Not normally, but with UTI   Restrict Fluid Intake: None   Stream Strength Good       BOWEL ASSESSMENT [] Deferred secondary to:   Frequency: Tend towards constipation. Every few days. Most Common Stool Consistency: Pellets, knobby. SYMPTOM QUESTIONNAIRE   Strain to have a BM: Yes   Include fiber in your diet: None   Take laxatives/enemas regularly: None   Pain with BM: Sometimes, not often   Strong urge to have BM: None   Leak/Stain Feces: None   Diarrhea often: None           GENERAL ASSESSMENT   [] Deferred secondary to:   Palpation    Observation    Posture Small stature, No gross abnormality. Range of Motion NL   Strength LE strength WFL, core strength 2/5 of the lower aspect   Gait NL   Sensation Intact   Edema None   Balance/Fall History NA   Special Tests Mild loss of flexibility in the hip girdle. OBSERVATION  [] Deferred secondary to:   Patient Safety Chaperone offered and declined. The pt did verbalize consent for internal vaginal examination following PT education of pt on the purpose of this activity and on the procedure using the model. Pt was educated in the intent of the PT to proceed slowly into the vaginal canal with permission requested of pt at every step of assessment prior to commencement by PT. Pt was also educated in her right to stop assessment, refuse any portion of this assessment, or to refuse assessment at any time without need for reason. The pt was educated that refusal would not impact her ability to seek care from this therapist in any way or result in therapist prejudice regarding her motivation to get better. Pt verbalized understanding and agreed again to internal examination by PT this date. Skin Condition External intact.     Urogenital Triangle    Introitus     Perineal Descent     External Clock         PELVIC FLOOR INTERNAL EXAM [] Deferred secondary to:   Exam Vaginal    Sensation Intact   Muscle Localization Very good in spite of profound weakness   Palpation/Tone NL   Pelvic Floor Strength 1/3/10/10   Relax after Contraction NL   Prolapse Cystocele. PELVIC HEALTH INCONTINENCE TREATMENT   Precautions:    Pain:     X in shaded column indicates activity completed today   Exercise/Intervention Reps/Sec  Notes   PF A and P and viscera  10'     Normal bladder  5'  Pee symphony, weak/small bladder myth, bladder habits (no JIC) covered. Rocking on toilet as well. Spread feet apart. Diet impact on bladder  5'     Urge control/Urge drills       Constipation ed ucation  25' x           Kegal quick  5'     Kegal hold  5'     Tummy tuck quick/hold       Pelvic Brace Quick  10x10 x    Pelvic Brace Hold  3\" x           OI stretch    Review   PFM stretch    Review   Piriformis stretch    Review   HS stretch       Pako stretch       Adductor stretch    Review                 Kegal with Ball Squeeze  10 x 5\" x    Kegal with Band  10 x 5\" x Blue                 Pelvic Tilt  5\" x 10 x    Pelvic Tilt with arm lift       Pelvic Tilt with leg march  10 x    Pelvic Tilt with opposites  10 x    Bridge  10 x    Pelvic Tilt SLR  4 x    Clamshell       Reverse Clamshell       Heel walk              Standing Kegal       Kegal Mini Squat       Standing Hip 3-way                         Specific Interventions Next Treatment: Strengthening and complete education. Activity/Treatment Tolerance:  []  Patient tolerated treatment well  []  Patient limited by fatigue  []  Patient limited by pain   []  Patient limited by medical complications  []  Other:     Patient Education:    [x]  HEP/Education Completed: The pt was given handouts of all exercises and these were reviewed with the pt with notations added by PT to enhance pt understanding and ease of follow through at home.         [employer] Access Code:  []  No new Education completed  []  Reviewed Prior HEP      []  Patient verbalized and/or demonstrated understanding of education provided. []  Patient unable to verbalize and/or demonstrate understanding of education provided. Will continue education. []  Barriers to learning:     Assessment: This pt continues to tolerate exercises very well. She was progressed in DLSP and found this very challenging due to lower core weakness. Her form, however, with cues tacitly and visual improved to perfect. Discussed plans for next visit and reviewed handouts with pt to assure good understanding for home use. She needs at least one more visit to progress to standing in order to promote pelvic organ support in functional positions of exertion that place organs at risk. Body Structures/Functions/Activity Limitations: PFM weakness with decreased localization and endurance, Decreased awareness of functional factors that increase pelvic organ strain, Decreased awareness of normal bladder habits, control, and diet effects on functioning, Abdominal and core weakness, Impaired balance, Impaired coordination, Impaired muscle tone and Impaired strength  Prognosis: Good    GOALS:  Patient Goal: To reduce pressure and increase bladder control, reduce frequency. Short Term Goals: 6 weeks  1. Pt will be able to identify normal bladder function/voiding patterns, diet impact on the bladder, and urge control strategies to enhance pt insight into potential causative factors and promote increased bladder control. -MET   2. The pt will demonstrate well localized PFM contraction in order to increase the efficacy of strengthening program. -MET  3. Pt will demonstrate the ability to delay urgency for 5' with good control to enable time to get to the bathroom. -MET  4. Pt will demonstrate independence with basic HEP designed to increase PFM and core strength and to enhance hip girdle flexibility for increased ease of strengthening. -MET   5. This pt will reduce nocturia to 3 times per night to establish more restful sleep patterns. NOT MET YET   6.   This pt will demo proper use of pelvic brace lifting, push, and pull with pelvic brace in order to promote continence during functional tasks. -MET      Long Term Goals: 12 weeks  1. This pt will report a 40% decrease in incontinence frequency and a 40% decrease in amount to increase comfort in public and reduce risk of integumentary compromise. 2. This pt will report normal urinary frequency to enabling ease of work/home task completion without interruption. 3. This pt will decrease IIQ and IUD scores to 6 respectively to indicate improved continence and efficacy of treatment  4. This pt will demo independence with advanced HEP in order to allow gains in therapy to be maintained long term and reduce the risk of further medical need/assessment. 5. This pt will demo PFM PERF 3/10/10/10 in order to increase continence in functional positions during home tasks. 6. This pt will demo 3/5 strength of the core with habitual bottom to top contraction in order to increase pelvic organ support during cough/sneeze/lift and to promote continence via effective pelvic brace. 7. The pt will abolish nocturia in order to attain restful sleep patterns. PLAN:  Treatment Recommendations: Strengthening, Range of Motion, Balance Training, Functional Mobility Training, Gait Training, Neuromuscular Re-education, Manual Therapy - Soft Tissue Mobilization, Manual Therapy - Joint Manipulation, Home Exercise Program, Patient Education and Modalities    [x]  Plan of care initiated. Plan to see patient 1 times every other week for 12 weeks to address the treatment planned outlined above.   []  Continue with current plan of care  []  Modify plan of care as follows:    []  Hold pending physician visit  []  Discharge    Time In 1100   Time Out 1145   Timed Code Minutes: 45   Total Treatment Time: 45       Electronically Signed by: Lorene Grady

## 2021-04-22 ENCOUNTER — TELEPHONE (OUTPATIENT)
Dept: FAMILY MEDICINE CLINIC | Age: 66
End: 2021-04-22

## 2021-04-22 ENCOUNTER — NURSE ONLY (OUTPATIENT)
Dept: FAMILY MEDICINE CLINIC | Age: 66
End: 2021-04-22
Payer: MEDICARE

## 2021-04-22 DIAGNOSIS — N39.0 RECURRENT UTI: Primary | ICD-10-CM

## 2021-04-22 DIAGNOSIS — N95.2 ATROPHIC VAGINITIS: ICD-10-CM

## 2021-04-22 LAB
BILIRUBIN, POC: NEGATIVE
BLOOD URINE, POC: NEGATIVE
CLARITY, POC: NORMAL
COLOR, POC: YELLOW
GLUCOSE URINE, POC: NEGATIVE
KETONES, POC: NEGATIVE
LEUKOCYTE EST, POC: NORMAL
NITRITE, POC: POSITIVE
PH, POC: 6.5
PROTEIN, POC: NEGATIVE
SPECIFIC GRAVITY, POC: >=1.03
UROBILINOGEN, POC: NORMAL

## 2021-04-22 PROCEDURE — 81003 URINALYSIS AUTO W/O SCOPE: CPT | Performed by: FAMILY MEDICINE

## 2021-04-22 RX ORDER — ESTRADIOL 0.1 MG/G
CREAM VAGINAL
Qty: 1 TUBE | Refills: 0 | Status: SHIPPED | OUTPATIENT
Start: 2021-04-22 | End: 2022-07-28

## 2021-04-22 RX ORDER — CEPHALEXIN 500 MG/1
500 CAPSULE ORAL 3 TIMES DAILY
Qty: 21 CAPSULE | Refills: 0 | Status: SHIPPED | OUTPATIENT
Start: 2021-04-22 | End: 2021-04-29

## 2021-04-22 RX ORDER — CEPHALEXIN 500 MG/1
500 CAPSULE ORAL
Qty: 90 CAPSULE | Refills: 0 | Status: SHIPPED | OUTPATIENT
Start: 2021-04-29 | End: 2021-07-28

## 2021-04-22 NOTE — PROGRESS NOTES
She reporting several days of UTI symptoms. Patient has had several is August of last year. We had discussed recently prophylactic care of such. Urine here shows leukocytes and nitrites. We will send Keflex for acute treatment but also Keflex 1 a day for daily prophylactic treatment. I would like her to do 1 month of vaginal estrogen.

## 2021-04-28 ENCOUNTER — HOSPITAL ENCOUNTER (OUTPATIENT)
Dept: PHYSICAL THERAPY | Age: 66
Setting detail: THERAPIES SERIES
Discharge: HOME | End: 2021-04-28
Payer: MEDICARE

## 2021-04-28 PROCEDURE — 97110 THERAPEUTIC EXERCISES: CPT

## 2021-04-28 NOTE — DISCHARGE SUMMARY
[facility]  PHYSICAL THERAPY  OUTPATIENT REHABILITATION - SPECIALIZED THERAPY SERVICES  [] PELVIC HEALTH EVALUATION  [x] DAILY NOTE  [] PROGRESS NOTE [] DISCHARGE NOTE    Date: 2021  Patient Name:  Mary Alice Verma  : 1955  MRN: [id number]  CSN: [id number]    Referring Practitioner Marcos Celestin MD   Diagnosis Frequency of micturition [R35.0]    Treatment Diagnosis M62.58 - Muscle Wasting and Atrophy, nec, other site  M62.81 - Muscle Weakness (Generalized)  N39.41 - Urge Incontinence, R39.15 - Urgency of Urination  R35.1 - Nocturia  R35.0 - Frequency of Micturition  R39.16 - Straining to Void and K55.09 - Other Constipation  K59.00 - Constipation, Unspecified   Date of Evaluation 3/3/21    Additional Pertinent History Bladder suspension, hysterectomy. Functional Outcome Measure Used IIQ and MARTA   Functional Outcome Score 0 and 0 (3/3/21)       Insurance: Primary: Payor: Leland Campbell /  /  / ,   Secondary:    Authorization Information:    Visit # 4, 4/10 for progress note   Visits Allowed: No pre cert, no visit limit   Recertification Date:    Physician Follow-Up:    Physician Orders:    History of Present Illness: The pt reports that she has UTI's, \"all the time\", and she suspects that her bladder is \"dropping\". She states she has a history of bladder suspension (Dec 2014) but she feel this is failing. She is currently on an antibiotic for UTI, and her symptoms include frequency and urgency. She does describe urgency even without a UTI. This condition has persisted for years. SUBJECTIVE: The pt states that she has gotten another UTI. She has been taking Kflex 3 x per day, she is done with this tomorrow but will return to the pharmacy in order to  prescription for one pill a month. She is feeling better at this point, she feels that it never did leave after the first time. The pt states that she has not had much time to do her exercises.   They do go well when she does them. Social/Functional History and Current Status:  Medications and Allergies have been reviewed and are listed on Medical History Questionnaire. Vikash Marino lives with spouse in a multiple floor home with a level surface to enter. 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 Retired teacher. 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 BLADDER ASSESSMENT [] Deferred secondary to:   Daily Fluid Ingestion: 1 cup coffee, 6 bottles of water per day. Urination Frequency Times/Day:  7-10 x normally  Times/Night: Once   Volume Occasionally less than expected. Urge Sensation Severe   SYMPTOM QUESTIONNAIRE   Loses Urine Upon: None when UTI resolved   Incontinence Volume: None   Frequency of Leakage: None   Wets the Bed:    Burning/Pain with Urination: Yes with UTI, tingling. Difficulty Starting a Stream of Urine: No   Incomplete Emptying Pt denies sensation. Strain to Empty Bladder:     Falling Out Feeling: None, pressure only. Urinate more than 7 times/day: Yes   Use a form of Leakage Protection: Not normally, but with UTI   Restrict Fluid Intake: None   Stream Strength Good       BOWEL ASSESSMENT [] Deferred secondary to: Information below from evaluation, not tested today. For reference only on this daily note. Frequency: Tend towards constipation. Every few days. Most Common Stool Consistency: Pellets, knobby.    SYMPTOM QUESTIONNAIRE   Strain to have a BM: Yes   Include fiber in your diet: None   Take laxatives/enemas regularly: None   Pain with BM: Sometimes, not often   Strong urge to have BM: None   Leak/Stain Feces: None   Diarrhea often: None GENERAL ASSESSMENT   [] Deferred secondary to:   Palpation    Observation    Posture Small stature, No gross abnormality. Range of Motion NL   Strength LE strength WFL, core strength 3/5 of the lower aspect   Gait NL   Sensation Intact   Edema None   Balance/Fall History NA   Special Tests            OBSERVATION  [] Deferred secondary to: Information below from evaluation, not tested today. For reference only on this daily note. Patient Safety Chaperone offered and declined. The pt did verbalize consent for internal vaginal examination following PT education of pt on the purpose of this activity and on the procedure using the model. Pt was educated in the intent of the PT to proceed slowly into the vaginal canal with permission requested of pt at every step of assessment prior to commencement by PT. Pt was also educated in her right to stop assessment, refuse any portion of this assessment, or to refuse assessment at any time without need for reason. The pt was educated that refusal would not impact her ability to seek care from this therapist in any way or result in therapist prejudice regarding her motivation to get better. Pt verbalized understanding and agreed again to internal examination by PT this date. Skin Condition External intact. Urogenital Triangle    Introitus     Perineal Descent     External Clock         PELVIC FLOOR INTERNAL EXAM [] Deferred secondary to:  NO examination, subjective report in standing and visual inspection for substitution. Exam Vaginal    Sensation Intact   Muscle Localization Very good in spite of profound weakness   Palpation/Tone NL   Pelvic Floor Strength 3/10/10/10   Relax after Contraction NL   Prolapse Cystocele.            PELVIC HEALTH INCONTINENCE TREATMENT   Precautions:    Pain:     X in shaded column indicates activity completed today   Exercise/Intervention Reps/Sec  Notes   PF A and P and viscera  10'     Normal bladder  5'  Pee symphony, weak/small bladder myth, bladder habits (no JIC) covered. Rocking on toilet as well. Spread feet apart. Diet impact on bladder  5'     Urge control/Urge drills       Constipation ed ucation  25' x           Kegal quick  5'     Kegal hold  5'     Tummy tuck quick/hold       Pelvic Brace Quick  10x10 x    Pelvic Brace Hold  3\" x           OI stretch    Review   PFM stretch    Review   Piriformis stretch    Review   HS stretch       Pako stretch       Adductor stretch    Review                 Kegal with Ball Squeeze  Review x    Kegal with Band  Review x Blue                 Pelvic Tilt  5\" x 10 x    Pelvic Tilt with arm lift       Pelvic Tilt with leg march  10 x    Pelvic Tilt with opposites  10 x    Bridge  10 x    Pelvic Tilt SLR  10 x    Clamshell       Reverse Clamshell       Heel walk  5 x           Standing Kegal  10 x    Kegal Mini Squat       Standing Hip 3-way                         Specific Interventions Next Treatment: Discharge today    Activity/Treatment Tolerance:  [x]  Patient tolerated treatment well  []  Patient limited by fatigue  []  Patient limited by pain   []  Patient limited by medical complications  []  Other:     Patient Education:    [x]  HEP/Education Completed: The pt was given handouts of all exercises and these were reviewed with the pt with notations added by PT to enhance pt understanding and ease of follow through at home.  [email or web address] Access Code:  []  No new Education completed  []  Reviewed Prior HEP      []  Patient verbalized and/or demonstrated understanding of education provided. []  Patient unable to verbalize and/or demonstrate understanding of education provided. Will continue education. []  Barriers to learning:     Assessment: This pt has had limited compliance with DLSP program due to being very busy with outdoor work but she has been getting her kegels in every day.   She was advanced into standing to increase strength in this functional position. At this point she is independent with her small HEP and she does verbalize feeling that she can continue on her own. PT in agreement with this after completion of program today. She is discharged to [facility]. Goals are met. Body Structures/Functions/Activity Limitations: PFM weakness with decreased localization and endurance, Decreased awareness of functional factors that increase pelvic organ strain, Decreased awareness of normal bladder habits, control, and diet effects on functioning, Abdominal and core weakness, Impaired balance, Impaired coordination, Impaired muscle tone and Impaired strength  Prognosis: Good    GOALS:  Patient Goal: To reduce pressure and increase bladder control, reduce frequency. Short Term Goals: 6 weeks  1. Pt will be able to identify normal bladder function/voiding patterns, diet impact on the bladder, and urge control strategies to enhance pt insight into potential causative factors and promote increased bladder control. -MET   2. The pt will demonstrate well localized PFM contraction in order to increase the efficacy of strengthening program. -MET  3. Pt will demonstrate the ability to delay urgency for 5' with good control to enable time to get to the bathroom. -MET  4. Pt will demonstrate independence with basic HEP designed to increase PFM and core strength and to enhance hip girdle flexibility for increased ease of strengthening. -MET   5. This pt will reduce nocturia to 3 times per night to establish more restful sleep patterns. -MET   6. This pt will demo proper use of pelvic brace lifting, push, and pull with pelvic brace in order to promote continence during functional tasks. -MET      Long Term Goals: 12 weeks  1. This pt will report a 40% decrease in incontinence frequency and a 40% decrease in amount to increase comfort in public and reduce risk of integumentary compromise. -MET  2.  This pt will report normal urinary frequency to enabling ease of work/home task completion without interruption. -MET  3. This pt will decrease IIQ and IUD scores to 6 respectively to indicate improved continence and efficacy of treatment-MET  4. This pt will demo independence with advanced HEP in order to allow gains in therapy to be maintained long term and reduce the risk of further medical need/assessment. -MET   5. This pt will demo PFM PERF 3/10/10/10 in order to increase continence in functional positions during home tasks. -MET  6. This pt will demo 3/5 strength of the core with habitual bottom to top contraction in order to increase pelvic organ support during cough/sneeze/lift and to promote continence via effective pelvic brace. -MET    7. The pt will abolish nocturia in order to attain restful sleep patterns. -MET    PLAN:  Treatment Recommendations: Strengthening, Range of Motion, Balance Training, Functional Mobility Training, Gait Training, Neuromuscular Re-education, Manual Therapy - Soft Tissue Mobilization, Manual Therapy - Joint Manipulation, Home Exercise Program, Patient Education and Modalities    [x]  Plan of care initiated. Plan to see patient 1 times every other week for 12 weeks to address the treatment planned outlined above.   []  Continue with current plan of care  []  Modify plan of care as follows:    []  Hold pending physician visit  []  Discharge    Time In 1100   Time Out 1145   Timed Code Minutes: 45   Total Treatment Time: 45       Electronically Signed by: Shelly Poole

## 2021-07-23 ENCOUNTER — OFFICE VISIT (OUTPATIENT)
Dept: FAMILY MEDICINE CLINIC | Age: 66
End: 2021-07-23
Payer: MEDICARE

## 2021-07-23 VITALS
HEIGHT: 67 IN | SYSTOLIC BLOOD PRESSURE: 122 MMHG | WEIGHT: 198 LBS | DIASTOLIC BLOOD PRESSURE: 78 MMHG | OXYGEN SATURATION: 98 % | BODY MASS INDEX: 31.08 KG/M2 | TEMPERATURE: 97.1 F | HEART RATE: 61 BPM

## 2021-07-23 DIAGNOSIS — N39.0 RECURRENT URINARY TRACT INFECTION: ICD-10-CM

## 2021-07-23 DIAGNOSIS — E78.2 MIXED HYPERLIPIDEMIA: Chronic | ICD-10-CM

## 2021-07-23 DIAGNOSIS — K21.9 GASTROESOPHAGEAL REFLUX DISEASE WITHOUT ESOPHAGITIS: Primary | ICD-10-CM

## 2021-07-23 DIAGNOSIS — K59.09 CONSTIPATION, CHRONIC: ICD-10-CM

## 2021-07-23 DIAGNOSIS — M25.512 ACUTE PAIN OF LEFT SHOULDER: ICD-10-CM

## 2021-07-23 PROCEDURE — 99214 OFFICE O/P EST MOD 30 MIN: CPT | Performed by: FAMILY MEDICINE

## 2021-07-23 NOTE — PATIENT INSTRUCTIONS
2 weeks after stopping Keflex, may repeat UA.     To wean off PPI desired,    -- switch to Pepcid 20 mg nightly with TUMS as needed for 2 weeks  -- eat lightly at night   -- then in 2 weeks, try stopping Pepcid     For weight loss,  -- take inventory of sugars and processed foods  -- clean, whole carbs in small amounts okay  -- plenty of fiber from veggies, nuts/seeds, whole grains, fruits  --- consider intermittent fasting (check out Dr. Fatou Daniel -- the Obesity Code)          Copied from [email or web address]  On 2/8/9476

## 2021-07-23 NOTE — PROGRESS NOTES
[address] [place] [address] [address], [address], [place]  Phone:  [phone]  YJU:[phone]       Name: Imtiaz Santiago  : 1955    Chief Complaint   Patient presents with    6 Month Follow-Up    Shoulder Pain     feels like a pulled muscle    Other     bump on white part of eye       HPI:     Imtiaz Santiago is a 78 y.o. female who presents today for     HPI    Left shoulder pain -- due to overstretching, in extension reaching back. 1+ months. No tearing or popping sound. No direct trauma. Moving arm up and back seems worse. Had something like this 30 years ago. GERD -- daily PPI. Working well. No problems. She wonders about weaning. Has constipation but seems somewhat better    Would like to lose weight. Aims for 1800 kcal. Has tried 1200 kcal.   Dinner: chicken pot pie or Meat, veg and fruit. Mostly home cooking. No pop or juices. High cholesterol -- tricor on board. Well controlled. No statin. UTI -- doing well. No further episodes. Did keflex treatment  plus suppression daily. Estrogen cream for vaginal atrophy and tissues support. She will be ending this 3 month trial very soon. She wants to try off antibiotics.        Current Outpatient Medications:     estradiol (ESTRACE VAGINAL) 0.1 MG/GM vaginal cream, Use 1/2 gram applied to vaginal area daily for 1 month, Disp: 1 Tube, Rfl: 0    Cholecalciferol (VITAMIN D3) 50 MCG (2000 UT) CAPS, Take by mouth, Disp: , Rfl:     pantoprazole (PROTONIX) 40 MG tablet, TAKE 1 TABLET DAILY, Disp: 90 tablet, Rfl: 3    fenofibrate (TRICOR) 145 MG tablet, TAKE 1 TABLET DAILY, Disp: 90 tablet, Rfl: 3    zinc gluconate 50 MG tablet, Take 50 mg by mouth daily, Disp: , Rfl:     vitamin B-12 (CYANOCOBALAMIN) 1000 MCG tablet, Take 1,000 mcg by mouth daily, Disp: , Rfl:     Ascorbic Acid (VITAMIN C) 500 MG CAPS, Take 1 tablet by mouth daily, Disp: , Rfl:     Aspirin-Acetaminophen-Caffeine (EXCEDRIN EXTRA STRENGTH PO), Take by mouth daily as needed, Disp: , Rfl:     Biotin 5000 MCG TABS, Take 5,000 mcg by mouth daily , Disp: , Rfl:     No Known Allergies    Review of Systems   Constitutional: Negative for fatigue and fever. Respiratory: Negative for shortness of breath. Cardiovascular: Negative for chest pain and leg swelling. Objective:     /78 (Site: Left Upper Arm, Position: Sitting, Cuff Size: Large Adult)   Pulse 61   Temp 97.1 °F (36.2 °C) (Skin)   Ht 5' 7\" (1.702 m)   Wt 198 lb (89.8 kg)   SpO2 98%   BMI 31.01 kg/m²     Physical Exam  Constitutional:       General: She is not in acute distress. Appearance: Normal appearance. She is not ill-appearing. Eyes:      Comments: At lateral corner of left eye, she has a 2 mm whitish, soft lesion, w/o surrounding induration or erythema. No discharge. Cardiovascular:      Rate and Rhythm: Normal rate and regular rhythm. Heart sounds: No murmur heard. Pulmonary:      Effort: Pulmonary effort is normal. No respiratory distress. Breath sounds: Normal breath sounds. No wheezing. Musculoskeletal:         General: No swelling. Comments: Left shoulder with mild ROM pain when arm abducted above 90 degrees or with extension. No weakness. No sensory deficits. Neurological:      Mental Status: She is alert and oriented to person, place, and time. Psychiatric:         Mood and Affect: Mood normal.         Assessment/Plan:     Diana Curtis was seen today for 6 month follow-up, shoulder pain and other. Diagnoses and all orders for this visit:    Gastroesophageal reflux disease without esophagitis  -     CBC Auto Differential; Future  -     Comprehensive Metabolic Panel; Future    Constipation, chronic  -     CBC Auto Differential; Future  -     Comprehensive Metabolic Panel; Future    Mixed hyperlipidemia  -     Lipid Panel; Future  -     CBC Auto Differential; Future  -     Comprehensive Metabolic Panel;  Future    Recurrent urinary tract infection  - Urine Rt Reflex To Culture; Future    Acute pain of left shoulder    Adult BMI 31.0-31.9 kg/sq m    continue current regimen  Dietary counseling given -- reviewed patient's current eating patterns. Discussed benefits (both short-term and long-term) of a healthy eating pattern for patient conditions and prevention of others. Specific recommendations given to patient based on agreed goals for diet change. Focus on quality of calories. As lower calorie diet has not been as effective, then consider adding a bit of healthy fat and lowering carbs. Resources shared regarding intermittent fasting as well. PPI weaning discussed. pepcid for transition. It will be helped by weight loss. Left shoulder exercises recommended. Urine will be done 2 weeks after she is done with Keflex. Return in about 6 months (around 1/23/2022) for AWV + labs. Future Appointments   Date Time Provider Sonya Robini   1/26/2022 10:20 AM Carmela Joyner MD [facility] [place]          This office note has been dictated. Effort was made to review for errors but some may have been missed. Please contact Justina Cloud of note for clarification if needed.        Electronically signed by Carmela Joyner MD on 7/30/2021 at 6:52 AM

## 2021-07-30 ASSESSMENT — ENCOUNTER SYMPTOMS: SHORTNESS OF BREATH: 0

## 2021-08-31 ENCOUNTER — HOSPITAL ENCOUNTER (OUTPATIENT)
Age: 66
Discharge: HOME | End: 2021-08-31
Payer: MEDICARE

## 2021-08-31 DIAGNOSIS — N39.0 RECURRENT URINARY TRACT INFECTION: ICD-10-CM

## 2021-08-31 LAB
BACTERIA: ABNORMAL /HPF
BILIRUBIN URINE: NEGATIVE
BLOOD, URINE: NEGATIVE
CASTS 2: ABNORMAL /LPF
CASTS UA: ABNORMAL /LPF
CHARACTER, URINE: ABNORMAL
COLOR: YELLOW
CRYSTALS, UA: ABNORMAL
EPITHELIAL CELLS, UA: ABNORMAL /HPF
GLUCOSE URINE: NEGATIVE MG/DL
KETONES, URINE: NEGATIVE
LEUKOCYTE ESTERASE, URINE: ABNORMAL
MISCELLANEOUS 2: ABNORMAL
NITRITE, URINE: POSITIVE
PH UA: 7 (ref 5–9)
PROTEIN UA: NEGATIVE
RBC URINE: ABNORMAL /HPF
RENAL EPITHELIAL, UA: ABNORMAL
SPECIFIC GRAVITY, URINE: 1.02 (ref 1–1.03)
UROBILINOGEN, URINE: 0.2 EU/DL (ref 0–1)
WBC UA: ABNORMAL /HPF
YEAST: ABNORMAL

## 2021-08-31 PROCEDURE — 81001 URINALYSIS AUTO W/SCOPE: CPT

## 2021-09-03 ENCOUNTER — TELEPHONE (OUTPATIENT)
Dept: FAMILY MEDICINE CLINIC | Age: 66
End: 2021-09-03

## 2021-09-03 DIAGNOSIS — N39.0 RECURRENT URINARY TRACT INFECTION: Primary | ICD-10-CM

## 2021-09-03 DIAGNOSIS — N30.00 ACUTE CYSTITIS WITHOUT HEMATURIA: Primary | ICD-10-CM

## 2021-09-03 RX ORDER — NITROFURANTOIN 25; 75 MG/1; MG/1
100 CAPSULE ORAL 2 TIMES DAILY
Qty: 10 CAPSULE | Refills: 0 | Status: SHIPPED | OUTPATIENT
Start: 2021-09-03 | End: 2021-09-08

## 2021-09-03 NOTE — TELEPHONE ENCOUNTER
Called and reviewed results with patient. She has verbalized an understanding and is concerned about having another UTI. She would like to have urine test done when she completes antibiotic to make sure it has cleared. I explained to her I would check with physician about putting another order in.

## 2021-09-03 NOTE — TELEPHONE ENCOUNTER
----- Message from Geno Dick MD sent at 9/3/2021  7:35 AM EDT -----  There was confusion with this order. I ordered an urine culture but I see that was cancelled. Let patient know that her urine did show concern for infection but we were not able to get culture. Will do nitrofurantoin 100 mg BID x 5 days.

## 2021-09-03 NOTE — RESULT ENCOUNTER NOTE
There was confusion with this order. I ordered an urine culture but I see that was cancelled. Let patient know that her urine did show concern for infection but we were not able to get culture. Will do nitrofurantoin 100 mg BID x 5 days.

## 2021-09-07 ENCOUNTER — TELEPHONE (OUTPATIENT)
Dept: FAMILY MEDICINE CLINIC | Age: 66
End: 2021-09-07

## 2021-09-07 NOTE — TELEPHONE ENCOUNTER
----- Message from Cristina Kellogg sent at 9/7/2021  9:59 AM EDT -----  Subject: Message to Provider    QUESTIONS  Information for Provider? PT is concerned with a bubble she found on her   dropped bladder. She made an appt on 9/22/21 but would like to come in   sooner if possible.   ---------------------------------------------------------------------------  --------------  CALL BACK INFO  What is the best way for the office to contact you? OK to leave message on   voicemail  Preferred Call Back Phone Number? [phone]  ---------------------------------------------------------------------------  --------------  SCRIPT ANSWERS  Relationship to Patient?  Self

## 2021-09-13 ENCOUNTER — HOSPITAL ENCOUNTER (OUTPATIENT)
Age: 66
Discharge: HOME | End: 2021-09-13
Payer: MEDICARE

## 2021-09-13 DIAGNOSIS — N39.0 RECURRENT URINARY TRACT INFECTION: Primary | ICD-10-CM

## 2021-09-13 DIAGNOSIS — R39.9 UTI SYMPTOMS: ICD-10-CM

## 2021-09-13 PROCEDURE — 87086 URINE CULTURE/COLONY COUNT: CPT

## 2021-09-13 PROCEDURE — 87186 SC STD MICRODIL/AGAR DIL: CPT

## 2021-09-13 PROCEDURE — 87077 CULTURE AEROBIC IDENTIFY: CPT

## 2021-09-13 PROCEDURE — 81001 URINALYSIS AUTO W/SCOPE: CPT

## 2021-09-14 ENCOUNTER — OFFICE VISIT (OUTPATIENT)
Dept: FAMILY MEDICINE CLINIC | Age: 66
End: 2021-09-14
Payer: MEDICARE

## 2021-09-14 VITALS
HEIGHT: 67 IN | SYSTOLIC BLOOD PRESSURE: 130 MMHG | DIASTOLIC BLOOD PRESSURE: 72 MMHG | BODY MASS INDEX: 30.76 KG/M2 | OXYGEN SATURATION: 97 % | WEIGHT: 196 LBS | TEMPERATURE: 98.1 F | HEART RATE: 73 BPM

## 2021-09-14 DIAGNOSIS — N39.0 RECURRENT URINARY TRACT INFECTION: ICD-10-CM

## 2021-09-14 DIAGNOSIS — N30.00 ACUTE CYSTITIS WITHOUT HEMATURIA: Primary | ICD-10-CM

## 2021-09-14 DIAGNOSIS — N89.8 VAGINAL CYST: ICD-10-CM

## 2021-09-14 LAB
BACTERIA: ABNORMAL /HPF
BILIRUBIN URINE: NEGATIVE
BLOOD, URINE: NEGATIVE
CASTS 2: ABNORMAL /LPF
CASTS UA: ABNORMAL /LPF
CHARACTER, URINE: CLEAR
COLOR: YELLOW
CRYSTALS, UA: ABNORMAL
EPITHELIAL CELLS, UA: ABNORMAL /HPF
GLUCOSE URINE: NEGATIVE MG/DL
KETONES, URINE: NEGATIVE
LEUKOCYTE ESTERASE, URINE: ABNORMAL
MISCELLANEOUS 2: ABNORMAL
NITRITE, URINE: POSITIVE
PH UA: 7 (ref 5–9)
PROTEIN UA: NEGATIVE
RBC URINE: ABNORMAL /HPF
RENAL EPITHELIAL, UA: ABNORMAL
SPECIFIC GRAVITY, URINE: 1.01 (ref 1–1.03)
UROBILINOGEN, URINE: 0.2 EU/DL (ref 0–1)
WBC UA: ABNORMAL /HPF
YEAST: ABNORMAL

## 2021-09-14 PROCEDURE — 99213 OFFICE O/P EST LOW 20 MIN: CPT | Performed by: FAMILY MEDICINE

## 2021-09-14 RX ORDER — CEPHALEXIN 500 MG/1
500 CAPSULE ORAL 3 TIMES DAILY
Qty: 21 CAPSULE | Refills: 0 | Status: SHIPPED | OUTPATIENT
Start: 2021-09-14 | End: 2021-09-21

## 2021-09-14 NOTE — PROGRESS NOTES
Sina Gaxiola (:  1955) is a 78 y.o. female,Established patient, here for evaluation of the following chief complaint(s): Other (feels something in vaginal area / possible prolapsed bladder)       ASSESSMENT/PLAN:  1. Acute cystitis without hematuria  -     cephALEXin (KEFLEX) 500 MG capsule; Take 1 capsule by mouth 3 times daily for 7 days, Disp-21 capsule, R-0Normal  2. Recurrent urinary tract infection  -     D-Mannose 500 MG CAPS; 1 po BID, Disp-60 capsule, R-5Normal  3. Vaginal cyst, appears like a Nabothian cyst    Patient with another UTI. She previously did well with vaginal estrogen. She would like to try a nonestrogen and nonantibiotic preventive. We will do Keflex for acute treatment. D-mannose for ongoing prevention. Vaginal cyst appears like a nabothian cyst.  Very benign appearing. She will monitor to make sure he does not enlarge or cause pain. Patient is preferring no surgery but knows that urology consults are available. Return if symptoms worsen or fail to improve. Subjective   SUBJECTIVE/OBJECTIVE:  HPI   Patient with concern for another urinary tract infection. Patient has had some winter and early spring issues with recurrent UTI. Then Estradiol for 1 month given, 2021. This seem to help her condition and she has not struggled until the last couple of weeks. She is having some symptoms. Her urine looks suspicious. What she is also concerned about is a spot on her private area that she found. She is concerned about a tumor ulcer cancer. She is s/p YUKI No fevers or chills. No bleeding. No increased mucus or discharge. No abnormal bowel trouble. No significant pelvic pain or abdominal pain except the typical dysuria and some frequency that she has had previously with UTIs. Review of Systems   As per HPI. Also no nausea vomiting or diarrhea. Also no flank pain.     Objective   Physical Exam   Engenders appears to be no acute distress she is awake alert oriented. In lithotomy position patient was examined and no abdominal pain noted. The vaginal examination shows normal tissues but somewhat pale. Patient definitely has a prolapsed bladder wall and at the end of the vaginal cuff there is a rounded cystic lesion that is under a centimeter with benign tissue and no bleeding or friability. An electronic signature was used to authenticate this note.     --Eddie Do MD

## 2021-09-15 LAB
ORGANISM: ABNORMAL
URINE CULTURE, ROUTINE: ABNORMAL

## 2021-09-16 ENCOUNTER — TELEPHONE (OUTPATIENT)
Dept: FAMILY MEDICINE CLINIC | Age: 66
End: 2021-09-16

## 2021-09-16 NOTE — TELEPHONE ENCOUNTER
----- Message from Brandon Villanueva MD sent at 9/15/2021  9:52 PM EDT -----  Please let patient know that her urine culture grew e.coli again. Keflex will cover. In April we did a round of antibiotics followed with estrogen which gave her about 5 months of relief. The prolapsed bladder likely contributes to poor emptying of the bladder. After Keflex, she is to start D-mannose as a preventive method for UTI. If this doesn't work, then I recommend urology consultation.

## 2021-09-16 NOTE — RESULT ENCOUNTER NOTE
Please let patient know that her urine culture grew e.coli again. Keflex will cover. In April we did a round of antibiotics followed with estrogen which gave her about 5 months of relief. The prolapsed bladder likely contributes to poor emptying of the bladder. After Keflex, she is to start D-mannose as a preventive method for UTI. If this doesn't work, then I recommend urology consultation.

## 2021-10-11 ENCOUNTER — HOSPITAL ENCOUNTER (OUTPATIENT)
Dept: MAMMOGRAPHY | Age: 66
Discharge: HOME | End: 2021-10-11
Payer: MEDICARE

## 2021-10-11 DIAGNOSIS — Z12.31 VISIT FOR SCREENING MAMMOGRAM: ICD-10-CM

## 2021-10-11 PROCEDURE — 77063 BREAST TOMOSYNTHESIS BI: CPT

## 2022-01-17 DIAGNOSIS — E78.1 PURE HYPERGLYCERIDEMIA: Chronic | ICD-10-CM

## 2022-01-17 DIAGNOSIS — K21.9 GASTROESOPHAGEAL REFLUX DISEASE WITHOUT ESOPHAGITIS: ICD-10-CM

## 2022-01-17 RX ORDER — FENOFIBRATE 145 MG/1
TABLET, COATED ORAL
Qty: 90 TABLET | Refills: 3 | Status: SHIPPED | OUTPATIENT
Start: 2022-01-17

## 2022-01-17 RX ORDER — PANTOPRAZOLE SODIUM 40 MG/1
TABLET, DELAYED RELEASE ORAL
Qty: 90 TABLET | Refills: 3 | Status: SHIPPED | OUTPATIENT
Start: 2022-01-17

## 2022-01-17 NOTE — TELEPHONE ENCOUNTER
Dianedia Ascencioa is requesting a refill on the following medications:  Requested Prescriptions     Pending Prescriptions Disp Refills    pantoprazole (PROTONIX) 40 MG tablet [Pharmacy Med Name: PANTOPRAZOLE SODIUM DR TABS 40MG] 90 tablet 3     Sig: TAKE 1 TABLET DAILY    fenofibrate (TRICOR) 145 MG tablet [Pharmacy Med Name: FENOFIBRATE TABS 145MG] 90 tablet 3     Sig: TAKE 1 TABLET DAILY       Date of last visit: 9/14/2021  Date of next visit (if applicable):1/26/2022  Date of last refill: 1/21/2021  Pharmacy Name: Ayanna Ashley LPN

## 2022-01-26 ENCOUNTER — OFFICE VISIT (OUTPATIENT)
Dept: FAMILY MEDICINE CLINIC | Age: 67
End: 2022-01-26
Payer: MEDICARE

## 2022-01-26 VITALS
HEART RATE: 74 BPM | TEMPERATURE: 97.4 F | OXYGEN SATURATION: 97 % | WEIGHT: 194 LBS | SYSTOLIC BLOOD PRESSURE: 136 MMHG | BODY MASS INDEX: 30.45 KG/M2 | DIASTOLIC BLOOD PRESSURE: 76 MMHG | HEIGHT: 67 IN

## 2022-01-26 DIAGNOSIS — N39.0 RECURRENT URINARY TRACT INFECTION: ICD-10-CM

## 2022-01-26 DIAGNOSIS — Z13.820 OSTEOPOROSIS SCREENING: ICD-10-CM

## 2022-01-26 DIAGNOSIS — Z78.0 POSTMENOPAUSAL: ICD-10-CM

## 2022-01-26 DIAGNOSIS — K59.09 CONSTIPATION, CHRONIC: ICD-10-CM

## 2022-01-26 DIAGNOSIS — Z00.00 ROUTINE GENERAL MEDICAL EXAMINATION AT A HEALTH CARE FACILITY: Primary | ICD-10-CM

## 2022-01-26 PROBLEM — E78.1 PURE HYPERGLYCERIDEMIA: Status: ACTIVE | Noted: 2021-01-21

## 2022-01-26 LAB
BILIRUBIN, POC: NEGATIVE
BLOOD URINE, POC: NEGATIVE
CLARITY, POC: CLEAR
COLOR, POC: YELLOW
GLUCOSE URINE, POC: NEGATIVE
KETONES, POC: NEGATIVE
LEUKOCYTE EST, POC: NEGATIVE
NITRITE, POC: POSITIVE
PH, POC: 5.5
PROTEIN, POC: NEGATIVE
SPECIFIC GRAVITY, POC: 1.02
UROBILINOGEN, POC: 0.2

## 2022-01-26 PROCEDURE — 81003 URINALYSIS AUTO W/O SCOPE: CPT | Performed by: FAMILY MEDICINE

## 2022-01-26 PROCEDURE — G0438 PPPS, INITIAL VISIT: HCPCS | Performed by: FAMILY MEDICINE

## 2022-01-26 PROCEDURE — 99213 OFFICE O/P EST LOW 20 MIN: CPT | Performed by: FAMILY MEDICINE

## 2022-01-26 RX ORDER — NITROFURANTOIN 25; 75 MG/1; MG/1
100 CAPSULE ORAL 2 TIMES DAILY
Qty: 10 CAPSULE | Refills: 0 | Status: SHIPPED | OUTPATIENT
Start: 2022-01-26 | End: 2022-01-31

## 2022-01-26 SDOH — ECONOMIC STABILITY: FOOD INSECURITY: WITHIN THE PAST 12 MONTHS, THE FOOD YOU BOUGHT JUST DIDN'T LAST AND YOU DIDN'T HAVE MONEY TO GET MORE.: NEVER TRUE

## 2022-01-26 SDOH — ECONOMIC STABILITY: FOOD INSECURITY: WITHIN THE PAST 12 MONTHS, YOU WORRIED THAT YOUR FOOD WOULD RUN OUT BEFORE YOU GOT MONEY TO BUY MORE.: NEVER TRUE

## 2022-01-26 ASSESSMENT — PATIENT HEALTH QUESTIONNAIRE - PHQ9
SUM OF ALL RESPONSES TO PHQ QUESTIONS 1-9: 0
1. LITTLE INTEREST OR PLEASURE IN DOING THINGS: 0
SUM OF ALL RESPONSES TO PHQ QUESTIONS 1-9: 0
SUM OF ALL RESPONSES TO PHQ QUESTIONS 1-9: 0
SUM OF ALL RESPONSES TO PHQ9 QUESTIONS 1 & 2: 0
2. FEELING DOWN, DEPRESSED OR HOPELESS: 0
SUM OF ALL RESPONSES TO PHQ QUESTIONS 1-9: 0

## 2022-01-26 ASSESSMENT — LIFESTYLE VARIABLES: HOW OFTEN DO YOU HAVE A DRINK CONTAINING ALCOHOL: 0

## 2022-01-26 ASSESSMENT — SOCIAL DETERMINANTS OF HEALTH (SDOH): HOW HARD IS IT FOR YOU TO PAY FOR THE VERY BASICS LIKE FOOD, HOUSING, MEDICAL CARE, AND HEATING?: NOT VERY HARD

## 2022-01-26 NOTE — PATIENT INSTRUCTIONS
Consider HEPA filter for mattress and pillow cover. Also flonase may be tried over the counter. Consider intermittent treatment with nasal saline. Stay hydrated. Patient Education        High-Fiber Diet: Care Instructions  Overview     A high-fiber diet may help you relieve constipation and feel less bloated. Your doctor and dietitian will help you make a high-fiber eating plan based on your personal needs. The plan will include the things you like to eat. It will also make sure that you get 25 to 35 grams of fiber a day. Before you make changes to the way you eat, be sure to talk with your doctor or dietitian. Follow-up care is a key part of your treatment and safety. Be sure to make and go to all appointments, and call your doctor if you are having problems. It's also a good idea to know your test results and keep a list of the medicines you take. How can you care for yourself at home? · You can increase how much fiber you get if you eat more of certain foods. These foods include:  ? Whole-grain breads and cereals. ? Fruits, such as pears, apples, and peaches. Eat the skins and peels if you can.  ? Vegetables, such as broccoli, cabbage, spinach, carrots, asparagus, and squash. ? Starchy vegetables. These include potatoes with skins, kidney beans, and lima beans. · Take a fiber supplement every day if your doctor recommends it. Examples are Benefiber, Citrucel, FiberCon, and Metamucil. Ask your doctor how much to take. · Drink plenty of fluids. If you have kidney, heart, or liver disease and have to limit fluids, talk with your doctor before you increase the amount of fluids you drink. Where can you learn more? Go to https://[employer]. org and sign in to your [employer] account. Enter S406 in the [employer] box to learn more about \"High-Fiber Diet: Care Instructions. \"     If you do not have an account, please click on the \"Sign Up Now\" link.   Current as of: September 8, 2021               Content Version: 13.1  © 1711-2413 Healthwise, Incorporated. Care instructions adapted under license by [facility] ([facility]). If you have questions about a medical condition or this instruction, always ask your healthcare professional. [address] any warranty or liability for your use of this information. Personalized Preventive Plan for Quenten Homans - 1/26/2022  Medicare offers a range of preventive health benefits. Some of the tests and screenings are paid in full while other may be subject to a deductible, co-insurance, and/or copay. Some of these benefits include a comprehensive review of your medical history including lifestyle, illnesses that may run in your family, and various assessments and screenings as appropriate. After reviewing your medical record and screening and assessments performed today your provider may have ordered immunizations, labs, imaging, and/or referrals for you. A list of these orders (if applicable) as well as your Preventive Care list are included within your After Visit Summary for your review. Other Preventive Recommendations:    · A preventive eye exam performed by an eye specialist is recommended every 1-2 years to screen for glaucoma; cataracts, macular degeneration, and other eye disorders. · A preventive dental visit is recommended every 6 months. · Try to get at least 150 minutes of exercise per week or 10,000 steps per day on a pedometer . · Order or download the FREE \"Exercise & Physical Activity: Your Everyday Guide\" from [employer]. Call 6-705.500.5558 or search [employer] online. · You need 6244-8395 mg of calcium and 8546-9418 IU of vitamin D per day.  It is possible to meet your calcium requirement with diet alone, but a vitamin D supplement is usually necessary to meet this goal.  · When exposed to the sun, use a sunscreen that protects against both UVA and UVB radiation with an SPF of 30 or greater. Reapply every 2 to 3 hours or after sweating, drying off with a towel, or swimming. · Always wear a seat belt when traveling in a car. Always wear a helmet when riding a bicycle or motorcycle.

## 2022-01-26 NOTE — PROGRESS NOTES
Medicare Annual Wellness Visit  Name: Morris Crespo Date: 2022   MRN: [id number] Sex: Female   Age: 78 y.o. Ethnicity: Non- / Non    : 1955 Race: White (non-)      Leticia Gonzalez is here for Medicare AWV    Screenings for behavioral, psychosocial and functional/safety risks, and cognitive dysfunction are all negative except as indicated below. These results, as well as other patient data from the [address] form, are documented in Flowsheets linked to this Encounter. Patient with history of recurrent UTI. She has been doing well lately but wants to make sure her urine is doing well. She has some bladder discomfort but it is mild. She has been doing Estrace and also d-mannose. Her last urine culture positive testing was on 2021. E. coli grew then. H/o high cholesterol. Doing well. Post nasal drip. Chronic. Off and on drainage. Year round. Would like to try something. Weather changes no difference. Can't smell much since young. Has been told deviated septum    constipation -- so-so. Could do better with fiber. Tries to stay hydrate. Wt Readings from Last 3 Encounters:   22 194 lb (88 kg)   21 196 lb (88.9 kg)   21 198 lb (89.8 kg)       No Known Allergies      Prior to Visit Medications    Medication Sig Taking?  Authorizing Provider   nitrofurantoin, macrocrystal-monohydrate, (MACROBID) 100 MG capsule Take 1 capsule by mouth 2 times daily for 5 days Yes Angel Haq MD   pantoprazole (PROTONIX) 40 MG tablet TAKE 1 TABLET DAILY Yes Angel Haq MD   fenofibrate (TRICOR) 145 MG tablet TAKE 1 TABLET DAILY Yes Angel Haq MD   vitamin D 25 MCG (1000 UT) CAPS Take by mouth Yes Historical Provider, MD   D-Mannose 500 MG CAPS 1 po BID Yes Angel Haq MD   zinc gluconate 50 MG tablet Take 50 mg by mouth daily Yes Historical Provider, MD   vitamin B-12 (CYANOCOBALAMIN) 1000 MCG tablet Take 1,000 mcg by wheezes, rales or rhonchi, normal air movement, no respiratory distress  Cardiovascular: normal rate and normal S1 and S2  Extremities: no cyanosis, clubbing or edema    Patient's complete Health Risk Assessment and screening values have been reviewed and are found in Flowsheets. The following problems were reviewed today and where indicated follow up appointments were made and/or referrals ordered. Positive Risk Factor Screenings with Interventions:              General Health and ACP:  General  In general, how would you say your health is?: Excellent  In the past 7 days, have you experienced any of the following? New or Increased Pain, New or Increased Fatigue, Loneliness, Social Isolation, Stress or Anger?: None of These  Do you get the social and emotional support that you need?: Yes  Do you have a Living Will?: Yes  Advance Directives     Power of [address] [place] [address] Will ACP-Advance Directive ACP-Power of     Not on File Not on File Not on File Not on File      General Health Risk Interventions:  · no further interventions    Health Habits/Nutrition:  Health Habits/Nutrition  Do you exercise for at least 20 minutes 2-3 times per week?: Yes  Have you lost any weight without trying in the past 3 months?: No  Do you eat only one meal per day?: No  Have you seen the dentist within the past year?: Yes  Body mass index: (!) 30.38  Health Habits/Nutrition Interventions:  · slowly losing weight    Hearing/Vision:  No exam data present  Hearing/Vision  Do you or your family notice any trouble with your hearing that hasn't been managed with hearing aids?: (!) Yes  Do you have difficulty driving, watching TV, or doing any of your daily activities because of your eyesight?: No  Have you had an eye exam within the past year?: Yes  Hearing/Vision Interventions:  · right ear infection in the past.  hearing test considered.  has ringing as well        Personalized Preventive Plan   Current Health Maintenance Status  Immunization History   Administered Date(s) Administered    COVID-19, Moderna, Primary or Immunocompromised, PF, 100mcg/0.5mL 02/12/2021, 03/12/2021, 01/13/2022    Influenza Vaccine, unspecified formulation 10/19/2012, 10/11/2013, 10/31/2014, 01/18/2017    Influenza Virus Vaccine 02/25/2020    Influenza, Quadv, IM, PF (6 mo and older Fluzone, Flulaval, Fluarix, and 3 yrs and older Afluria) 02/25/2020    Pneumococcal Conjugate 13-valent (Sena Crape) 08/19/2019    Tdap (Boostrix, Adacel) 10/22/2009, 02/25/2020    Zoster Recombinant (Shingrix) 01/13/2022        Health Maintenance   Topic Date Due    Depression Screen  Never done    Annual Wellness Visit (AWV)  Never done    Pneumococcal 65+ years Vaccine (2 of 2 - PPSV23) 08/19/2020    Flu vaccine (1) 09/01/2021    Shingles Vaccine (2 of 2) 03/10/2022    Breast cancer screen  10/11/2023    Colon cancer screen colonoscopy  10/11/2024    Lipid screen  10/09/2026    DTaP/Tdap/Td vaccine (3 - Td or Tdap) 02/25/2030    DEXA (modify frequency per FRAX score)  Completed    COVID-19 Vaccine  Completed    Hepatitis A vaccine  Aged Out    Hepatitis B vaccine  Aged Out    Hib vaccine  Aged Out    Meningococcal (ACWY) vaccine  Aged Out    Hepatitis C screen  Discontinued     Recommendations for [employer] Due: see orders and patient instructions/AVS.  . Recommended screening schedule for the next 5-10 years is provided to the patient in written form: see Patient Instructions/AVS.    Anshul Hays was seen today for medicare awv. Diagnoses and all orders for this visit:    Routine general medical examination at a health care facility    Postmenopausal  -     DEXA BONE DENSITY AXIAL SKELETON; Future    Osteoporosis screening  -     DEXA BONE DENSITY AXIAL SKELETON; Future    Recurrent urinary tract infection  -     POCT Urinalysis No Micro (Auto)  -     Culture, Urine  -     nitrofurantoin, macrocrystal-monohydrate, (MACROBID) 100 MG capsule;  Take 1 capsule by mouth 2 times daily for 5 days    Constipation, chronic        UA is + nitrite, o/w neg today. High fiber diet recommended  Flonase, hepa filter for mattress and pillow  Good hydration. Return in 6 months (on 7/26/2022).       Corrine Chirinos MD

## 2022-01-28 LAB
ORGANISM: ABNORMAL
URINE CULTURE, ROUTINE: ABNORMAL

## 2022-02-01 DIAGNOSIS — N30.00 ACUTE CYSTITIS WITHOUT HEMATURIA: Primary | ICD-10-CM

## 2022-02-01 RX ORDER — CEPHALEXIN 500 MG/1
500 CAPSULE ORAL 3 TIMES DAILY
Qty: 21 CAPSULE | Refills: 0 | Status: SHIPPED | OUTPATIENT
Start: 2022-02-01 | End: 2022-02-08

## 2022-02-02 ENCOUNTER — TELEPHONE (OUTPATIENT)
Dept: FAMILY MEDICINE CLINIC | Age: 67
End: 2022-02-02

## 2022-02-02 NOTE — TELEPHONE ENCOUNTER
----- Message from Tristian Niño MD sent at 2/1/2022 11:12 PM EST -----  Please let patient know that her urine did how e.coli -- will send keflex to [facility]. Thank you.

## 2022-02-02 NOTE — RESULT ENCOUNTER NOTE
Please let patient know that her urine did how e.coli -- will send keflex to [facility]. Thank you.

## 2022-02-18 ENCOUNTER — HOSPITAL ENCOUNTER (OUTPATIENT)
Dept: WOMENS IMAGING | Age: 67
Discharge: HOME | End: 2022-02-18
Payer: MEDICARE

## 2022-02-18 DIAGNOSIS — Z13.820 OSTEOPOROSIS SCREENING: ICD-10-CM

## 2022-02-18 DIAGNOSIS — Z78.0 POSTMENOPAUSAL: ICD-10-CM

## 2022-02-18 PROCEDURE — 77080 DXA BONE DENSITY AXIAL: CPT

## 2022-05-14 LAB
ALBUMIN SERPL-MCNC: 4.3 G/DL
ALP BLD-CCNC: 44 U/L
ALT SERPL-CCNC: 17 U/L
ANION GAP SERPL CALCULATED.3IONS-SCNC: 4.3 MMOL/L
AST SERPL-CCNC: 19 U/L
BASOPHILS ABSOLUTE: 0.1 /ΜL
BASOPHILS RELATIVE PERCENT: NORMAL
BILIRUB SERPL-MCNC: 0.5 MG/DL (ref 0.1–1.4)
BUN BLDV-MCNC: 20 MG/DL
CALCIUM SERPL-MCNC: 9.9 MG/DL
CHLORIDE BLD-SCNC: 104 MMOL/L
CHOLESTEROL, TOTAL: 143 MG/DL
CHOLESTEROL/HDL RATIO: 1.5
CO2: 30 MMOL/L
CREAT SERPL-MCNC: 1.1 MG/DL
EOSINOPHILS ABSOLUTE: 0.2 /ΜL
EOSINOPHILS RELATIVE PERCENT: NORMAL
GFR CALCULATED: 50
GLUCOSE BLD-MCNC: 105 MG/DL
HCT VFR BLD CALC: 40.5 % (ref 36–46)
HDLC SERPL-MCNC: 48 MG/DL (ref 35–70)
HEMOGLOBIN: 13.4 G/DL (ref 12–16)
LDL CHOLESTEROL CALCULATED: 73 MG/DL (ref 0–160)
LYMPHOCYTES ABSOLUTE: 1.9 /ΜL
LYMPHOCYTES RELATIVE PERCENT: NORMAL
MCH RBC QN AUTO: 30.4 PG
MCHC RBC AUTO-ENTMCNC: 33 G/DL
MCV RBC AUTO: 92.4 FL
MONOCYTES ABSOLUTE: 0.4 /ΜL
MONOCYTES RELATIVE PERCENT: NORMAL
NEUTROPHILS ABSOLUTE: 2.2 /ΜL
NEUTROPHILS RELATIVE PERCENT: NORMAL
NONHDLC SERPL-MCNC: NORMAL MG/DL
PDW BLD-RTO: 13 %
PLATELET # BLD: 306 K/ΜL
PMV BLD AUTO: 9.2 FL
POTASSIUM SERPL-SCNC: 4.4 MMOL/L
RBC # BLD: 4.39 10^6/ΜL
SODIUM BLD-SCNC: 141 MMOL/L
TOTAL PROTEIN: 6.9
TRIGL SERPL-MCNC: 109 MG/DL
VLDLC SERPL CALC-MCNC: 22 MG/DL
WBC # BLD: 4.8 10^3/ML

## 2022-06-09 ENCOUNTER — OFFICE VISIT (OUTPATIENT)
Dept: FAMILY MEDICINE CLINIC | Age: 67
End: 2022-06-09
Payer: MEDICARE

## 2022-06-09 ENCOUNTER — TELEPHONE (OUTPATIENT)
Dept: FAMILY MEDICINE CLINIC | Age: 67
End: 2022-06-09

## 2022-06-09 VITALS
HEIGHT: 67 IN | SYSTOLIC BLOOD PRESSURE: 148 MMHG | BODY MASS INDEX: 31.14 KG/M2 | TEMPERATURE: 97.3 F | RESPIRATION RATE: 16 BRPM | WEIGHT: 198.4 LBS | DIASTOLIC BLOOD PRESSURE: 92 MMHG | OXYGEN SATURATION: 94 % | HEART RATE: 77 BPM

## 2022-06-09 DIAGNOSIS — R00.2 PALPITATIONS: ICD-10-CM

## 2022-06-09 DIAGNOSIS — N30.90 CYSTITIS: Primary | ICD-10-CM

## 2022-06-09 DIAGNOSIS — R01.1 MURMUR: ICD-10-CM

## 2022-06-09 LAB
BILIRUBIN, POC: NEGATIVE
BLOOD URINE, POC: NEGATIVE
CLARITY, POC: NORMAL
COLOR, POC: NORMAL
GLUCOSE URINE, POC: NEGATIVE
KETONES, POC: NEGATIVE
LEUKOCYTE EST, POC: NEGATIVE
NITRITE, POC: POSITIVE
PH, POC: 6
PROTEIN, POC: NEGATIVE
SPECIFIC GRAVITY, POC: 1.03
UROBILINOGEN, POC: 0.2

## 2022-06-09 PROCEDURE — 93000 ELECTROCARDIOGRAM COMPLETE: CPT | Performed by: NURSE PRACTITIONER

## 2022-06-09 PROCEDURE — 81002 URINALYSIS NONAUTO W/O SCOPE: CPT | Performed by: NURSE PRACTITIONER

## 2022-06-09 PROCEDURE — 1123F ACP DISCUSS/DSCN MKR DOCD: CPT | Performed by: NURSE PRACTITIONER

## 2022-06-09 PROCEDURE — 99214 OFFICE O/P EST MOD 30 MIN: CPT | Performed by: NURSE PRACTITIONER

## 2022-06-09 RX ORDER — NITROFURANTOIN 25; 75 MG/1; MG/1
100 CAPSULE ORAL 2 TIMES DAILY
Qty: 20 CAPSULE | Refills: 0 | Status: SHIPPED | OUTPATIENT
Start: 2022-06-09 | End: 2022-06-19

## 2022-06-09 ASSESSMENT — ENCOUNTER SYMPTOMS
WHEEZING: 0
EYE DISCHARGE: 0
EYE REDNESS: 0
COUGH: 0
CONSTIPATION: 0
BACK PAIN: 0
TROUBLE SWALLOWING: 0
SORE THROAT: 0
VOMITING: 0
ABDOMINAL PAIN: 0
DIARRHEA: 0
EYE PAIN: 0
NAUSEA: 0
EYE ITCHING: 0
RHINORRHEA: 0
SHORTNESS OF BREATH: 0
CHEST TIGHTNESS: 0
SINUS PRESSURE: 0

## 2022-06-09 ASSESSMENT — PATIENT HEALTH QUESTIONNAIRE - PHQ9
SUM OF ALL RESPONSES TO PHQ QUESTIONS 1-9: 0
SUM OF ALL RESPONSES TO PHQ QUESTIONS 1-9: 0
2. FEELING DOWN, DEPRESSED OR HOPELESS: 0
SUM OF ALL RESPONSES TO PHQ QUESTIONS 1-9: 0
1. LITTLE INTEREST OR PLEASURE IN DOING THINGS: 0
SUM OF ALL RESPONSES TO PHQ9 QUESTIONS 1 & 2: 0
SUM OF ALL RESPONSES TO PHQ QUESTIONS 1-9: 0

## 2022-06-09 NOTE — TELEPHONE ENCOUNTER
Patient notified of Echo appointment on 6/15/2022 @ 1:00, second floor Heart and vascular. Arrive 15 min. Early. Patient agreed and verbalized understanding.

## 2022-06-09 NOTE — PROGRESS NOTES
Chi Cooper (:  1955) is a 75 y.o. female,Established patient, here for evaluation of the following chief complaint(s):  Urinary Frequency (pt states there is an odor to the urine) and Palpitations         ASSESSMENT/PLAN:  1. Cystitis  -     nitrofurantoin, macrocrystal-monohydrate, (MACROBID) 100 MG capsule; Take 1 capsule by mouth 2 times daily for 10 days, Disp-20 capsule, R-0Normal  2. Palpitations  -     EKG 12 Lead; Future  -     Echocardiogram complete; Future  3. Murmur  -     Echocardiogram complete; Future  POC EKG SR without ectopy. Rate 62. Return in about 2 weeks (around 2022). Subjective   SUBJECTIVE/OBJECTIVE:  Dysuria   This is a new problem. Episode onset: symptoms started yesterday. The problem occurs every urination. The problem has been gradually worsening. The quality of the pain is described as burning. There has been no fever. She is sexually active. There is no history of pyelonephritis. Associated symptoms include chills, flank pain (left), frequency and urgency. Pertinent negatives include no discharge, hematuria, nausea or vomiting. She has tried nothing for the symptoms. Her past medical history is significant for recurrent UTIs. Palpitations   This is a new problem. The current episode started 1 to 4 weeks ago. The problem occurs intermittently. The problem has been unchanged. Nothing aggravates the symptoms. Pertinent negatives include no anxiety, chest pain, coughing, dizziness, fever, irregular heartbeat, malaise/fatigue, nausea, near-syncope, shortness of breath, vomiting or weakness. She has tried nothing for the symptoms. Risk factors include dyslipidemia and post menopause. Review of Systems   Constitutional: Positive for chills. Negative for activity change, appetite change, fatigue, fever and malaise/fatigue.    HENT: Negative for congestion, ear discharge, ear pain, hearing loss, postnasal drip, rhinorrhea, sinus pressure, sore throat and trouble swallowing. Eyes: Negative for pain, discharge, redness and itching. Respiratory: Negative for cough, chest tightness, shortness of breath and wheezing. Cardiovascular: Positive for palpitations. Negative for chest pain, leg swelling and near-syncope. Palpitations worse over the past week   Gastrointestinal: Negative for abdominal pain, constipation, diarrhea, nausea and vomiting. Endocrine: Negative. Genitourinary: Positive for dysuria, flank pain (left), frequency and urgency. Negative for hematuria. Musculoskeletal: Negative for arthralgias, back pain, joint swelling and myalgias. Skin: Negative. Neurological: Negative for dizziness, weakness, light-headedness and headaches. Hematological: Negative. Psychiatric/Behavioral: The patient is not nervous/anxious. Objective   Physical Exam  Constitutional:       General: She is not in acute distress. Appearance: Normal appearance. She is normal weight. She is not ill-appearing. HENT:      Head: Normocephalic and atraumatic. Cardiovascular:      Rate and Rhythm: Normal rate. Heart sounds: Murmur (1/6) heard. Pulmonary:      Effort: Pulmonary effort is normal.   Abdominal:      General: Abdomen is flat. Palpations: Abdomen is soft. Tenderness: There is left CVA tenderness. There is no right CVA tenderness. Skin:     General: Skin is warm and dry. Neurological:      Mental Status: She is alert. An electronic signature was used to authenticate this note.     --DARREL Funes - CNP

## 2022-06-13 ENCOUNTER — TELEPHONE (OUTPATIENT)
Dept: FAMILY MEDICINE CLINIC | Age: 67
End: 2022-06-13

## 2022-06-13 LAB
ORGANISM: ABNORMAL
URINE CULTURE, ROUTINE: ABNORMAL

## 2022-06-13 NOTE — TELEPHONE ENCOUNTER
----- Message from DARREL Becerra CNP sent at 6/13/2022  9:59 AM EDT -----  E-coli in urine, continue Macrobid until gone.

## 2022-06-15 ENCOUNTER — HOSPITAL ENCOUNTER (OUTPATIENT)
Dept: NON INVASIVE DIAGNOSTICS | Age: 67
Discharge: HOME | End: 2022-06-15
Payer: MEDICARE

## 2022-06-15 DIAGNOSIS — R00.2 PALPITATIONS: ICD-10-CM

## 2022-06-15 DIAGNOSIS — R01.1 MURMUR: ICD-10-CM

## 2022-06-15 LAB
LV EF: 55 %
LVEF MODALITY: NORMAL

## 2022-06-15 PROCEDURE — 93306 TTE W/DOPPLER COMPLETE: CPT

## 2022-06-17 ENCOUNTER — TELEPHONE (OUTPATIENT)
Dept: FAMILY MEDICINE CLINIC | Age: 67
End: 2022-06-17

## 2022-06-17 NOTE — TELEPHONE ENCOUNTER
----- Message from DARREL Grewal CNP sent at 6/17/2022  9:21 AM EDT -----  Echo is good.    May consider stress test if palpitation continue

## 2022-06-22 ENCOUNTER — NURSE ONLY (OUTPATIENT)
Dept: FAMILY MEDICINE CLINIC | Age: 67
End: 2022-06-22
Payer: MEDICARE

## 2022-06-22 DIAGNOSIS — N39.0 RECURRENT URINARY TRACT INFECTION: ICD-10-CM

## 2022-06-22 DIAGNOSIS — N30.90 CYSTITIS: Primary | ICD-10-CM

## 2022-06-22 LAB
BILIRUBIN, POC: NEGATIVE
BLOOD URINE, POC: NEGATIVE
CLARITY, POC: CLEAR
COLOR, POC: YELLOW
GLUCOSE URINE, POC: NEGATIVE
KETONES, POC: NEGATIVE
LEUKOCYTE EST, POC: NEGATIVE
NITRITE, POC: NEGATIVE
PH, POC: 7
PROTEIN, POC: NEGATIVE
SPECIFIC GRAVITY, POC: 1.02
UROBILINOGEN, POC: 0.2

## 2022-06-22 PROCEDURE — 81003 URINALYSIS AUTO W/O SCOPE: CPT | Performed by: NURSE PRACTITIONER

## 2022-07-28 ENCOUNTER — OFFICE VISIT (OUTPATIENT)
Dept: FAMILY MEDICINE CLINIC | Age: 67
End: 2022-07-28
Payer: MEDICARE

## 2022-07-28 VITALS
HEART RATE: 61 BPM | DIASTOLIC BLOOD PRESSURE: 78 MMHG | SYSTOLIC BLOOD PRESSURE: 136 MMHG | BODY MASS INDEX: 31.11 KG/M2 | WEIGHT: 198.2 LBS | TEMPERATURE: 97.8 F | RESPIRATION RATE: 16 BRPM | OXYGEN SATURATION: 96 % | HEIGHT: 67 IN

## 2022-07-28 DIAGNOSIS — K21.9 GASTROESOPHAGEAL REFLUX DISEASE WITHOUT ESOPHAGITIS: ICD-10-CM

## 2022-07-28 DIAGNOSIS — R35.0 URINARY FREQUENCY: ICD-10-CM

## 2022-07-28 DIAGNOSIS — R82.90 MALODOROUS URINE: Primary | ICD-10-CM

## 2022-07-28 DIAGNOSIS — N39.0 RECURRENT URINARY TRACT INFECTION: ICD-10-CM

## 2022-07-28 DIAGNOSIS — E78.2 MIXED HYPERLIPIDEMIA: Chronic | ICD-10-CM

## 2022-07-28 DIAGNOSIS — L29.9 ITCHING OF EAR: ICD-10-CM

## 2022-07-28 LAB
BILIRUBIN, POC: ABNORMAL
BLOOD URINE, POC: ABNORMAL
CLARITY, POC: ABNORMAL
COLOR, POC: ABNORMAL
GLUCOSE URINE, POC: ABNORMAL
KETONES, POC: ABNORMAL
LEUKOCYTE EST, POC: ABNORMAL
NITRITE, POC: ABNORMAL
PH, POC: 6
PROTEIN, POC: ABNORMAL
SPECIFIC GRAVITY, POC: >1.03
UROBILINOGEN, POC: 0.2

## 2022-07-28 PROCEDURE — 1123F ACP DISCUSS/DSCN MKR DOCD: CPT | Performed by: FAMILY MEDICINE

## 2022-07-28 PROCEDURE — 99214 OFFICE O/P EST MOD 30 MIN: CPT | Performed by: FAMILY MEDICINE

## 2022-07-28 PROCEDURE — 81003 URINALYSIS AUTO W/O SCOPE: CPT | Performed by: FAMILY MEDICINE

## 2022-07-28 RX ORDER — HYDROCORTISONE AND ACETIC ACID 20.75; 10.375 MG/ML; MG/ML
3 SOLUTION AURICULAR (OTIC) 2 TIMES DAILY
Qty: 10 ML | Refills: 5 | Status: SHIPPED | OUTPATIENT
Start: 2022-07-28 | End: 2022-08-07

## 2022-07-28 RX ORDER — NITROFURANTOIN 25; 75 MG/1; MG/1
100 CAPSULE ORAL 2 TIMES DAILY
Qty: 10 CAPSULE | Refills: 0 | Status: SHIPPED | OUTPATIENT
Start: 2022-07-28 | End: 2022-08-02

## 2022-07-28 NOTE — PROGRESS NOTES
Shelly Buckner (:  1955) is a 75 y.o. female,Established patient, here for evaluation of the following chief complaint(s):  6 Month Follow-Up and Urinary Frequency (Foul smelling)         ASSESSMENT/PLAN:  1. Malodorous urine  -     Culture, Urine  -     POCT Urinalysis No Micro (Auto)  2. Urinary frequency  -     Culture, Urine  -     POCT Urinalysis No Micro (Auto)  3. Mixed hyperlipidemia  4. Gastroesophageal reflux disease without esophagitis  5. Recurrent urinary tract infection  -     Culture, Urine  -     nitrofurantoin, macrocrystal-monohydrate, (MACROBID) 100 MG capsule; Take 1 capsule by mouth in the morning and 1 capsule before bedtime. Do all this for 5 days. , Disp-10 capsule, R-0Normal  6. Itching of ear  -     acetic acid-hydrocortisone (VOSOL-HC) 1-2 % otic solution; Place 3 drops into both ears in the morning and 3 drops before bedtime. Do all this for 10 days. , Disp-10 mL, R-5Normal      Consider Methenamine, if another bacteria positive culture. Good fluid hydration  Will do October labs, if able. Return in about 6 months (around 2023). Subjective   SUBJECTIVE/OBJECTIVE:  HPI    1-2 weeks of worsening dysuria and frequency. Had UTI 22 -- E.coli -- nitrofurantoin did well  22 -- e.coli. treatment also helped. No fever/chills. No flank pain  -- D- mannose online tried but not ongoing. Last year had recurrence of UTI, so Estrogen therapy -- 2021 for 1 month -- along with nitrofurantoin. Such kept her doing well for about 5 months. She doesn't believe this was worth it. Not tolerating pop, so that has helped with some symptoms. Itching of ear medication refill requested. Likely seborrheic dermatitis. GERD doing well  High cholesterol. Stable. Goes to lab fair in [place]. Gluc 105 noted. She will repeat in October  Covid19 exposure. Objective   Physical Exam   Gen: NAD, AAO x 3, coherent, pleasant    CTAB.  RRR    Lab Results   Component Value Date    CHOL 143 05/14/2022    CHOL 162 10/09/2021    CHOL 165 01/28/2021     Lab Results   Component Value Date    TRIG 109 05/14/2022    TRIG 116 10/09/2021    TRIG 99 01/28/2021     Lab Results   Component Value Date    HDL 48 05/14/2022    HDL 50 10/09/2021    HDL 56 01/28/2021     Lab Results   Component Value Date    LDLCALC 73 05/14/2022    LDLCALC 89 10/09/2021    LDLCALC 89 01/28/2021     Lab Results   Component Value Date    VLDL 22 05/14/2022    VLDL 23 10/09/2021    VLDL 22 05/11/2019     Lab Results   Component Value Date    CHOLHDLRATIO 1.5 05/14/2022    CHOLHDLRATIO 1.8 10/09/2021     Lab Results   Component Value Date     05/14/2022    K 4.4 05/14/2022     05/14/2022    CO2 30 05/14/2022    BUN 20 05/14/2022    CREATININE 1.1 05/14/2022    GLUCOSE 105 05/14/2022    CALCIUM 9.9 05/14/2022    PROT 7.2 01/12/2021    LABALBU 4.3 05/14/2022    BILITOT 0.5 05/14/2022    ALKPHOS 44 05/14/2022    AST 19 05/14/2022    ALT 17 05/14/2022    LABGLOM 50 05/14/2022    AGRATIO 4.0 (H) 05/14/2016       Lab Results   Component Value Date    WBC 4.8 05/14/2022    HGB 13.4 05/14/2022    HCT 40.5 05/14/2022    MCV 92.4 05/14/2022     05/14/2022         Office Visit on 07/28/2022   Component Date Value Ref Range Status    Color, UA 07/28/2022 DARK YELLOW   Final    Clarity, UA 07/28/2022 CLOUDY   Final    Glucose, UA POC 07/28/2022 NEG   Final    Bilirubin, UA 07/28/2022 NEG   Final    Ketones, UA 07/28/2022 NEG   Final    Spec Grav, UA 07/28/2022 >1.030   Final    Blood, UA POC 07/28/2022 NEG   Final    pH, UA 07/28/2022 6.0   Final    Protein, UA POC 07/28/2022 NEG   Final    Urobilinogen, UA 07/28/2022 0.2   Final    Leukocytes, UA 07/28/2022 TRACE   Final    Nitrite, UA 07/28/2022 POS   Final       An electronic signature was used to authenticate this note.     --Mohinder Vega MD

## 2022-07-28 NOTE — PATIENT INSTRUCTIONS
-HDL (good cholesterol): this can be improved by exercising regularly, consuming healthy oils such as olive oil, following a low carb diet, avoiding trans fat (found in processed foods and margarines), eating fish, and refraining from smoking.     -Triglycerides: Triglycerides can be improved by limited sugar intake, losing weight, following a low carb diet, increasing fiber intake (fruits, vegetables, whole grains), exercise regularly, avoiding trans fats (commonly found in commercially fried foods and baked goods made with partially hydrogenated oils), eating fatty fish twice weekly (salmon, tuna, sardines), increasing unsaturated fat intake (olive oil, nuts, avocado) and limiting alcohol intake.          Copied from [email or web address]  On 5/2/8548

## 2022-08-01 LAB
ORGANISM: ABNORMAL
URINE CULTURE, ROUTINE: ABNORMAL

## 2022-08-17 ENCOUNTER — TELEPHONE (OUTPATIENT)
Dept: FAMILY MEDICINE CLINIC | Age: 67
End: 2022-08-17

## 2022-08-17 DIAGNOSIS — K21.9 GASTROESOPHAGEAL REFLUX DISEASE WITHOUT ESOPHAGITIS: ICD-10-CM

## 2022-08-17 RX ORDER — METHENAMINE HIPPURATE 1000 MG/1
1 TABLET ORAL 2 TIMES DAILY WITH MEALS
Qty: 60 TABLET | Refills: 5 | Status: SHIPPED | OUTPATIENT
Start: 2022-08-17 | End: 2023-02-13

## 2022-08-17 NOTE — TELEPHONE ENCOUNTER
Pt states that she rec'd a myChart message about taking a maintenance medication for her frequent UTIs. Note from Dr. Andi Henderson within 7/28/22 culture result    \"Nitrofurantoin will treat this infection okay. I would like to offer methenamine for the prevention of recurrent urinary tract infections. Usually it is well tolerated and not expensive. Also, it is preferred by urology above antibiotics to reduce changes of antibioticresistance and injury to your adina. Would you like to proceed with that? Written by Dulce Armas MD on 8/2/2022  7:06 AM EDT  Seen by patient Jamaica Sebastian on 8/2/2022  7:17 AM\"    Spoke with pt and she would like to proceed.

## 2022-08-17 NOTE — TELEPHONE ENCOUNTER
Very well. Methenamine 1 gram orally BID to start now for now, eventually she may go down to 1 a day.

## 2022-10-17 ENCOUNTER — OFFICE VISIT (OUTPATIENT)
Dept: FAMILY MEDICINE CLINIC | Age: 67
End: 2022-10-17
Payer: MEDICARE

## 2022-10-17 ENCOUNTER — HOSPITAL ENCOUNTER (OUTPATIENT)
Dept: MAMMOGRAPHY | Age: 67
Discharge: HOME | End: 2022-10-17
Payer: MEDICARE

## 2022-10-17 VITALS
HEIGHT: 67 IN | BODY MASS INDEX: 30.61 KG/M2 | TEMPERATURE: 97.1 F | OXYGEN SATURATION: 95 % | WEIGHT: 195 LBS | RESPIRATION RATE: 14 BRPM | SYSTOLIC BLOOD PRESSURE: 138 MMHG | HEART RATE: 76 BPM | DIASTOLIC BLOOD PRESSURE: 88 MMHG

## 2022-10-17 DIAGNOSIS — N39.0 RECURRENT URINARY TRACT INFECTION: Primary | ICD-10-CM

## 2022-10-17 DIAGNOSIS — Z12.31 VISIT FOR SCREENING MAMMOGRAM: ICD-10-CM

## 2022-10-17 DIAGNOSIS — N30.00 ACUTE CYSTITIS WITHOUT HEMATURIA: ICD-10-CM

## 2022-10-17 LAB
BILIRUBIN, POC: NEGATIVE
BLOOD URINE, POC: NEGATIVE
CLARITY, POC: CLEAR
COLOR, POC: YELLOW
GLUCOSE URINE, POC: NEGATIVE
KETONES, POC: NEGATIVE
LEUKOCYTE EST, POC: NORMAL
NITRITE, POC: NEGATIVE
PH, POC: 7
PROTEIN, POC: NEGATIVE
SPECIFIC GRAVITY, POC: 1.02
UROBILINOGEN, POC: 0.2

## 2022-10-17 PROCEDURE — 77063 BREAST TOMOSYNTHESIS BI: CPT

## 2022-10-17 PROCEDURE — 81002 URINALYSIS NONAUTO W/O SCOPE: CPT | Performed by: NURSE PRACTITIONER

## 2022-10-17 PROCEDURE — 99213 OFFICE O/P EST LOW 20 MIN: CPT | Performed by: NURSE PRACTITIONER

## 2022-10-17 PROCEDURE — 1123F ACP DISCUSS/DSCN MKR DOCD: CPT | Performed by: NURSE PRACTITIONER

## 2022-10-17 RX ORDER — SULFAMETHOXAZOLE AND TRIMETHOPRIM 800; 160 MG/1; MG/1
1 TABLET ORAL 2 TIMES DAILY
Qty: 14 TABLET | Refills: 0 | Status: SHIPPED | OUTPATIENT
Start: 2022-10-17 | End: 2022-10-21 | Stop reason: ALTCHOICE

## 2022-10-17 RX ORDER — HYDROCORTISONE AND ACETIC ACID 20.75; 10.375 MG/ML; MG/ML
SOLUTION AURICULAR (OTIC)
COMMUNITY
Start: 2022-10-13

## 2022-10-17 ASSESSMENT — PATIENT HEALTH QUESTIONNAIRE - PHQ9
SUM OF ALL RESPONSES TO PHQ QUESTIONS 1-9: 0
SUM OF ALL RESPONSES TO PHQ9 QUESTIONS 1 & 2: 0
1. LITTLE INTEREST OR PLEASURE IN DOING THINGS: 0
SUM OF ALL RESPONSES TO PHQ QUESTIONS 1-9: 0
2. FEELING DOWN, DEPRESSED OR HOPELESS: 0

## 2022-10-17 ASSESSMENT — ENCOUNTER SYMPTOMS
GASTROINTESTINAL NEGATIVE: 1
RESPIRATORY NEGATIVE: 1

## 2022-10-17 NOTE — PROGRESS NOTES
Raisa Calixto (:  1955) is a 75 y.o. female,Established patient, here for evaluation of the following chief complaint(s):  Flank Pain (Right-)         ASSESSMENT/PLAN:  1. Recurrent urinary tract infection  -     [facility]  -     POCT Urinalysis no Micro  -     Culture, Urine  -     amoxicillin-clavulanate (AUGMENTIN) 875-125 MG per tablet; Take 1 tablet by mouth 2 times daily for 7 days, Disp-14 tablet, R-0Normal  2. Acute cystitis without hematuria  -     POCT Urinalysis no Micro  -     Culture, Urine    Return if symptoms worsen or fail to improve. Tylenol as needed for pain. Continue fluids. Urology consult. Subjective   SUBJECTIVE/OBJECTIVE:  TY Montesinos presents today for evaluation of UTI. She is complaining of right side flank pain for the past 3 days. She denies fever, dysuria, frequency and hematuria. She has been taking Hiprex once daily. Review of Systems   Constitutional:  Negative for chills and fever. Respiratory: Negative. Cardiovascular: Negative. Gastrointestinal: Negative. Genitourinary:  Positive for flank pain. Negative for difficulty urinating, dysuria, frequency and hematuria. Hematological: Negative. Objective   Physical Exam  Constitutional:       General: She is not in acute distress. Appearance: Normal appearance. She is normal weight. She is not ill-appearing. Cardiovascular:      Rate and Rhythm: Normal rate. Pulses: Normal pulses. Pulmonary:      Effort: Pulmonary effort is normal.   Abdominal:      Tenderness: There is no right CVA tenderness or left CVA tenderness. Neurological:      Mental Status: She is alert. An electronic signature was used to authenticate this note.     --DARREL Simon - CNP

## 2022-10-20 LAB
ORGANISM: ABNORMAL
URINE CULTURE, ROUTINE: ABNORMAL

## 2022-10-21 ENCOUNTER — TELEPHONE (OUTPATIENT)
Dept: FAMILY MEDICINE CLINIC | Age: 67
End: 2022-10-21

## 2022-10-21 RX ORDER — AMOXICILLIN AND CLAVULANATE POTASSIUM 875; 125 MG/1; MG/1
1 TABLET, FILM COATED ORAL 2 TIMES DAILY
Qty: 14 TABLET | Refills: 0 | Status: SHIPPED | OUTPATIENT
Start: 2022-10-21 | End: 2022-10-28

## 2022-10-21 NOTE — TELEPHONE ENCOUNTER
----- Message from DARREL Eller CNP sent at 10/21/2022  9:26 AM EDT -----  Urine culture shows E. Faecalis. Bactrim is insufficient. Will need to change to antibiotic.

## 2022-11-02 ENCOUNTER — OFFICE VISIT (OUTPATIENT)
Dept: UROLOGY | Age: 67
End: 2022-11-02
Payer: MEDICARE

## 2022-11-02 VITALS — HEIGHT: 67 IN | BODY MASS INDEX: 30.61 KG/M2 | WEIGHT: 195 LBS

## 2022-11-02 DIAGNOSIS — N39.0 RECURRENT URINARY TRACT INFECTION: Primary | ICD-10-CM

## 2022-11-02 LAB
BILIRUBIN URINE: NEGATIVE
BLOOD URINE, POC: NEGATIVE
CHARACTER, URINE: CLEAR
COLOR, URINE: YELLOW
GLUCOSE URINE: NEGATIVE MG/DL
KETONES, URINE: NEGATIVE
LEUKOCYTE CLUMPS, URINE: ABNORMAL
NITRITE, URINE: NEGATIVE
PH, URINE: 6.5 (ref 5–9)
POST VOID RESIDUAL (PVR): 215 ML
PROTEIN, URINE: NEGATIVE MG/DL
SPECIFIC GRAVITY, URINE: 1.02 (ref 1–1.03)
UROBILINOGEN, URINE: 0.2 EU/DL (ref 0–1)

## 2022-11-02 PROCEDURE — 1123F ACP DISCUSS/DSCN MKR DOCD: CPT | Performed by: NURSE PRACTITIONER

## 2022-11-02 PROCEDURE — 51798 US URINE CAPACITY MEASURE: CPT | Performed by: NURSE PRACTITIONER

## 2022-11-02 PROCEDURE — 81003 URINALYSIS AUTO W/O SCOPE: CPT | Performed by: NURSE PRACTITIONER

## 2022-11-02 PROCEDURE — 99204 OFFICE O/P NEW MOD 45 MIN: CPT | Performed by: NURSE PRACTITIONER

## 2022-11-02 ASSESSMENT — ENCOUNTER SYMPTOMS
BACK PAIN: 0
NAUSEA: 0
VOMITING: 0
ABDOMINAL PAIN: 0

## 2022-11-02 NOTE — PATIENT INSTRUCTIONS
Instruction for Double Voiding  Your healthcare provider has recommended double voiding twice daily to help your bladder to empty properly preventing leakage, infections, urinary retention. Double Voiding:   Urinate as usual.   Stand up and walk/wait 10-15 minutes. Go back to bathroom and urinate a second time.

## 2022-11-02 NOTE — PROGRESS NOTES
[facility] [address] [place] [address].  [address]  [facility] [address]  Dept: [phone]  Loc: [phone]    Visit Date: 11/2/2022        HPI:     Aysha Holt is a 75 y.o. female who presents today for:  Chief Complaint   Patient presents with    Urinary Tract Infection     Patient states that she has recurrent UTI's        HPI  Pt referred to our office by James ROJO for recurrent UTIs. Pt reports a history of recurrent UTIs in the last 15-20 years time but did have some UTIs as a child. Reports increasing incidence. Had cultures from 1/2022, 6/2022, and 7/2022 significant for E coli. Culture from 10/17/22 significant for E faecalis. She recently finished antibiotic and reports symptoms are resolved. She usually gets urinary frequency and nocturia when she has an infection. Reports hx of hysterectomy and \"bladder sling\" by Dr. Caleb Looney remotely. Reports 3 vaginal deliveries. Notes some feeling of prolapse at times \"like her bladder has fallen again\". PVR today 215 mls. No gross hematuria.       Current Outpatient Medications   Medication Sig Dispense Refill    acetic acid-hydrocortisone (VOSOL-HC) 1-2 % otic solution PLACE 3 DROPS INTO BOTH EARS IN THE MORNING AND BEFORE BEDTIME FOR 10 DAYS      methenamine (HIPREX) 1 g tablet Take 1 tablet by mouth 2 times daily (with meals) 60 tablet 5    pantoprazole (PROTONIX) 40 MG tablet TAKE 1 TABLET DAILY 90 tablet 3    fenofibrate (TRICOR) 145 MG tablet TAKE 1 TABLET DAILY 90 tablet 3    vitamin D 25 MCG (1000 UT) CAPS Take by mouth      zinc gluconate 50 MG tablet Take 50 mg by mouth daily      vitamin B-12 (CYANOCOBALAMIN) 1000 MCG tablet Take 1,000 mcg by mouth daily      Ascorbic Acid (VITAMIN C) 500 MG CAPS Take 1 tablet by mouth daily      Aspirin-Acetaminophen-Caffeine (EXCEDRIN EXTRA STRENGTH PO) Take by mouth daily as needed      Biotin 5000 MCG TABS Take 5,000 mcg by mouth daily No current facility-administered medications for this visit. Past Medical History  Rosangela Colindres  has a past medical history of GERD (gastroesophageal reflux disease) and Hyperlipidemia. Past Surgical History  The patient  has a past surgical history that includes Middle ear surgery; bladder suspension; Hysterectomy, vaginal; blepharoplasty (Bilateral); Cholecystectomy, laparoscopic (05/12/2017); pr bx of breast,vacuum asst,image guide (Left, 1981); and Hysterectomy (2015). Family History  This patient's family history includes Cervical Cancer (age of onset: 61) in her sister; High Cholesterol in her mother; Parkinsonism in her father. Social History  Rosangela Colindres  reports that she has never smoked. She has never used smokeless tobacco. She reports current alcohol use. She reports that she does not use drugs. Subjective:      Review of Systems   Constitutional:  Negative for activity change, appetite change, chills, diaphoresis, fatigue, fever and unexpected weight change. Gastrointestinal:  Negative for abdominal pain, nausea and vomiting. Genitourinary:  Positive for frequency and urgency. Negative for decreased urine volume, difficulty urinating, dysuria, flank pain and hematuria. Musculoskeletal:  Negative for back pain. Objective:   Ht 5' 7\" (1.702 m)   Wt 195 lb (88.5 kg)   BMI 30.54 kg/m²     Physical Exam  Vitals reviewed. Constitutional:       General: She is not in acute distress. Appearance: Normal appearance. She is well-developed. She is not ill-appearing or diaphoretic. HENT:      Head: Normocephalic and atraumatic. Right Ear: External ear normal.      Left Ear: External ear normal.      Nose: Nose normal.      Mouth/Throat:      Mouth: Mucous membranes are moist.   Eyes:      General: No scleral icterus. Right eye: No discharge. Left eye: No discharge. Neck:      Vascular: No JVD. Trachea: No tracheal deviation.    Cardiovascular:      Rate and Rhythm: Normal rate and regular rhythm. Pulmonary:      Effort: Pulmonary effort is normal. No respiratory distress. Abdominal:      General: There is no distension. Tenderness: There is no abdominal tenderness. There is no right CVA tenderness or left CVA tenderness. Musculoskeletal:         General: No tenderness. Normal range of motion. Skin:     General: Skin is warm and dry. Neurological:      Mental Status: She is alert and oriented to person, place, and time. Mental status is at baseline. Psychiatric:         Mood and Affect: Mood normal.         Behavior: Behavior normal.         Thought Content: Thought content normal.       POC  Results for POC orders placed in visit on 11/02/22   POCT Urinalysis No Micro (Auto)   Result Value Ref Range    Glucose, Ur Negative NEGATIVE mg/dl    Bilirubin Urine Negative     Ketones, Urine Negative NEGATIVE    Specific Gravity, Urine 1.020 1.002 - 1.030    Blood, UA POC Negative NEGATIVE    pH, Urine 6.50 5.0 - 9.0    Protein, Urine Negative NEGATIVE mg/dl    Urobilinogen, Urine 0.20 0.0 - 1.0 eu/dl    Nitrite, Urine Negative NEGATIVE    Leukocyte Clumps, Urine Small (A) NEGATIVE    Color, Urine Yellow [id number]-STRAW    Character, Urine Clear [id number]   poct post void residual   Result Value Ref Range    post void residual 215 ml       Patients recent PSA values are as follows  No results found for: PSA, PSADIA     Recent BUN/Creatinine:  Lab Results   Component Value Date/Time    BUN 20 05/14/2022 01:14 PM    CREATININE 1.1 05/14/2022 01:14 PM       Assessment:   Recurrent UTI  Urinary retention  Plan:     Pt's  mls today in office. Discussed voiding strategies, double voiding BID, and crede maneuver. Increase water intake. Continue Hiprex 1 gram PO BID and take vitamin C with hiprex to improve efficacy. Check renal us. Renal US with appt to review results and possible pelvic exam at that time.

## 2022-11-03 ENCOUNTER — TELEPHONE (OUTPATIENT)
Dept: UROLOGY | Age: 67
End: 2022-11-03

## 2022-11-03 NOTE — TELEPHONE ENCOUNTER
Patient scheduled for renal us  at [address] [place] [address],[address] on  Nov 11 1230 arrive 1215. Order mailed with instructions or given to the patient in the office . No carb beverages, well hybrated. Pt voiced understanding. Mailed to pt.

## 2022-11-11 ENCOUNTER — HOSPITAL ENCOUNTER (OUTPATIENT)
Dept: ULTRASOUND IMAGING | Age: 67
Discharge: HOME | End: 2022-11-11
Payer: MEDICARE

## 2022-11-11 DIAGNOSIS — N39.0 RECURRENT URINARY TRACT INFECTION: ICD-10-CM

## 2022-11-11 PROCEDURE — 76770 US EXAM ABDO BACK WALL COMP: CPT

## 2022-11-30 ENCOUNTER — OFFICE VISIT (OUTPATIENT)
Dept: UROLOGY | Age: 67
End: 2022-11-30
Payer: MEDICARE

## 2022-11-30 VITALS
BODY MASS INDEX: 30.76 KG/M2 | WEIGHT: 196 LBS | SYSTOLIC BLOOD PRESSURE: 122 MMHG | HEIGHT: 67 IN | DIASTOLIC BLOOD PRESSURE: 72 MMHG

## 2022-11-30 DIAGNOSIS — N39.0 URINARY TRACT INFECTION, RECURRENT: Primary | ICD-10-CM

## 2022-11-30 PROBLEM — N18.30 CHRONIC RENAL DISEASE, STAGE III (HCC): Status: ACTIVE | Noted: 2022-11-30

## 2022-11-30 PROCEDURE — 1123F ACP DISCUSS/DSCN MKR DOCD: CPT | Performed by: NURSE PRACTITIONER

## 2022-11-30 PROCEDURE — 99214 OFFICE O/P EST MOD 30 MIN: CPT | Performed by: NURSE PRACTITIONER

## 2022-11-30 RX ORDER — METHENAMINE HIPPURATE 1000 MG/1
1 TABLET ORAL 2 TIMES DAILY WITH MEALS
Qty: 180 TABLET | Refills: 3 | Status: SHIPPED | OUTPATIENT
Start: 2022-11-30 | End: 2023-05-29

## 2022-11-30 ASSESSMENT — ENCOUNTER SYMPTOMS
NAUSEA: 0
BACK PAIN: 0
ABDOMINAL PAIN: 0
VOMITING: 0

## 2022-11-30 NOTE — PROGRESS NOTES
[address] [address]  Dept: [phone]  Loc: [phone]    Visit Date: 11/30/2022        HPI:     Florence Rose is a 75 y.o. female who presents today for:  Chief Complaint   Patient presents with    Results       HPI  Pt seen in follow up for recurrent UTI. Pt reports a history of recurrent UTIs in the last 15-20 years time but did have some UTIs as a child. Reports increasing incidence. Had cultures from 1/2022, 6/2022, and 7/2022 significant for E coli. Culture from 10/17/22 significant for E faecalis. She recently finished antibiotic and reports symptoms are resolved. She usually gets urinary frequency and nocturia when she has an infection. Reports hx of hysterectomy and \"bladder sling\" by Dr. Conde Neighbor remotely. Reports 3 vaginal deliveries. Notes some feeling of prolapse at times \"like her bladder has fallen again\".  mls at OV 11/2/22. Pt advised on voiding strategies, double voiding BID, crede maneuver. She is currently on Hiprex 1 gram PO BID with vitamin C. Completed renal US prior to appt today. No symptoms of UTI. Has been trying to empty bladder completely, take her time and use voiding strategies. Doesn't feel like she has urine remaining when she urinates.     Current Outpatient Medications   Medication Sig Dispense Refill    acetic acid-hydrocortisone (VOSOL-HC) 1-2 % otic solution PLACE 3 DROPS INTO BOTH EARS IN THE MORNING AND BEFORE BEDTIME FOR 10 DAYS      methenamine (HIPREX) 1 g tablet Take 1 tablet by mouth 2 times daily (with meals) 60 tablet 5    pantoprazole (PROTONIX) 40 MG tablet TAKE 1 TABLET DAILY 90 tablet 3    fenofibrate (TRICOR) 145 MG tablet TAKE 1 TABLET DAILY 90 tablet 3    vitamin D 25 MCG (1000 UT) CAPS Take by mouth      zinc gluconate 50 MG tablet Take 50 mg by mouth daily      vitamin B-12 (CYANOCOBALAMIN) 1000 MCG tablet Take 1,000 mcg by mouth daily      Ascorbic Acid (VITAMIN C) 500 MG CAPS Take 1 tablet by mouth daily      Aspirin-Acetaminophen-Caffeine (EXCEDRIN EXTRA STRENGTH PO) Take by mouth daily as needed      Biotin 5000 MCG TABS Take 5,000 mcg by mouth daily        No current facility-administered medications for this visit. Past Medical History  Eleazar Kathleen  has a past medical history of GERD (gastroesophageal reflux disease) and Hyperlipidemia. Past Surgical History  The patient  has a past surgical history that includes Middle ear surgery; bladder suspension; Hysterectomy, vaginal; blepharoplasty (Bilateral); Cholecystectomy, laparoscopic (05/12/2017); pr bx of breast,vacuum asst,image guide (Left, 1981); and Hysterectomy (2015). Family History  This patient's family history includes Cervical Cancer (age of onset: 61) in her sister; High Cholesterol in her mother; Parkinsonism in her father. Social History  Eleazar Kathleen  reports that she has never smoked. She has never used smokeless tobacco. She reports current alcohol use. She reports that she does not use drugs. Subjective:      Review of Systems   Constitutional:  Negative for activity change, appetite change, chills, diaphoresis, fatigue, fever and unexpected weight change. Gastrointestinal:  Negative for abdominal pain, nausea and vomiting. Genitourinary:  Negative for decreased urine volume, difficulty urinating, dysuria, flank pain, frequency, hematuria and urgency. Musculoskeletal:  Negative for back pain. Objective:   Ht 5' 7\" (1.702 m)   BMI 30.54 kg/m²     Physical Exam  Vitals reviewed. Constitutional:       General: She is not in acute distress. Appearance: Normal appearance. She is well-developed. She is not ill-appearing or diaphoretic. HENT:      Head: Normocephalic and atraumatic.       Right Ear: External ear normal.      Left Ear: External ear normal.      Nose: Nose normal.      Mouth/Throat:      Mouth: Mucous membranes are moist. Eyes:      General: No scleral icterus. Right eye: No discharge. Left eye: No discharge. Neck:      Vascular: No JVD. Trachea: No tracheal deviation. Pulmonary:      Effort: Pulmonary effort is normal. No respiratory distress. Abdominal:      General: There is no distension. Tenderness: There is no abdominal tenderness. There is no right CVA tenderness or left CVA tenderness. Musculoskeletal:         General: No tenderness. Normal range of motion. Skin:     General: Skin is warm and dry. Neurological:      Mental Status: She is alert and oriented to person, place, and time. Mental status is at baseline. Psychiatric:         Mood and Affect: Mood normal.         Behavior: Behavior normal.         Thought Content: Thought content normal.       POC  No results found for this visit on 11/30/22. Patients recent PSA values are as follows  No results found for: PSA, PSADIA     Recent BUN/Creatinine:  Lab Results   Component Value Date/Time    BUN 20 05/14/2022 01:14 PM    CREATININE 1.1 05/14/2022 01:14 PM       Radiology  The patient has had a Renal Ultrasound 11/11/22 which I have independently reviewed along with its accompanying report. The study demonstrates a distended urinary bladder. No hydronephrosis. R renal cyst 1.6 cms.  mls. Assessment:   Urinary retention  Recurrent UTIs  Plan:     Reviewed renal ultrasound results with Trang Delvalle. No hydronephrosis, noted R renal cyst, distended urinary bladder with  mls. No symptoms of UTI today. Pt continues to show evidence of urinary retention with PVRs of 212 mls and 187 mls which is likely contributing to her UTIs. Discussed cystoscopy and pelvic exam to further evaluate and she is agreeable. Notes she may have some recurrence of her pelvic organ prolapse as she can feel some bulging at times at her vaginal opening.  Discussed CIC and pt prefers to hold off at this time pending further evaluation with cystoscopy and pelvic exam.      Continue Hiprex and Vitamin C. Refill for Hiprex sent to pharmacy. Schedule cystoscopy and pelvic exam with one of the physicians.

## 2022-12-29 ENCOUNTER — PROCEDURE VISIT (OUTPATIENT)
Dept: UROLOGY | Age: 67
End: 2022-12-29
Payer: MEDICARE

## 2022-12-29 VITALS — BODY MASS INDEX: 30.13 KG/M2 | HEIGHT: 67 IN | WEIGHT: 192 LBS | RESPIRATION RATE: 16 BRPM

## 2022-12-29 DIAGNOSIS — N39.0 RECURRENT URINARY TRACT INFECTION: ICD-10-CM

## 2022-12-29 DIAGNOSIS — N39.0 URINARY TRACT INFECTION, RECURRENT: Primary | ICD-10-CM

## 2022-12-29 PROCEDURE — 99214 OFFICE O/P EST MOD 30 MIN: CPT | Performed by: UROLOGY

## 2022-12-29 PROCEDURE — 52000 CYSTOURETHROSCOPY: CPT | Performed by: UROLOGY

## 2022-12-29 PROCEDURE — 1123F ACP DISCUSS/DSCN MKR DOCD: CPT | Performed by: UROLOGY

## 2022-12-29 RX ORDER — PHENOL 1.4 %
1 AEROSOL, SPRAY (ML) MUCOUS MEMBRANE DAILY
COMMUNITY

## 2022-12-29 NOTE — PROGRESS NOTES
Pt reports a history of recurrent UTIs in the last 15-20 years time but did have some UTIs as a child. Reports increasing incidence. Had cultures from 1/2022, 6/2022, and 7/2022 significant for E coli. Culture from 10/17/22 significant for E faecalis. She recently finished antibiotic and reports symptoms are resolved. She usually gets urinary frequency and nocturia when she has an infection. Reports hx of hysterectomy and \"bladder sling\" by Dr. Mi Graf remotely. Reports 3 vaginal deliveries. Notes some feeling of prolapse at times \"like her bladder has fallen again\".  mls at OV 11/2/22. Pt advised on voiding strategies, double voiding BID, crede maneuver. She is currently on Hiprex 1 gram PO BID with vitamin C. No symptoms of UTI. Has been trying to empty bladder completely, take her time and use voiding strategies. Doesn't feel like she has urine remaining when she urinates. Reviewed renal ultrasound; No hydronephrosis, noted R renal cyst, distended urinary bladder with  mls. No symptoms of UTI today. Pt continues to show evidence of urinary retention with PVRs of 212 mls and 187 mls which is likely contributing to her UTIs. Cystoscopy Operative Note  Surgeon: Sophie Garcia M.D, MD   Anesthesia: Urethral 2%    Findings:   The patient was prepped and draped in the usual sterile fashion. The flexible cystoscope was advanced through the urethra and into the bladder. The bladder was thoroughly inspected and the following was noted:    Vagina: normal appearing vagina with normal color and discharge, no lesions, moderate to severe anterior prolapse    Urethra: normal appearing urethra with no masses, tenderness or lesions  Bladder: No tumors or CIS noted. No bladder diverticulum. mild trabeculation noted. Ureters:   Orifices with normal configuration and location. The cystoscope was removed.   The patient tolerated the procedure well.      Discussed patient's constipation may or may not contribute to her incomplete bladder emptying and OAB with incontinence  Discussed that cystocele repair will help her with OAB and incontinence but cannot guarantee it will help with her bladder emptying and rec UTIs    Discussed cystocele repair at length  Risks: I discussed all the risks, benefits, alternatives and possible complications or surgery as well as expectations and post-op recovery. Patient will think about it and let us know          .

## 2023-01-11 DIAGNOSIS — K21.9 GASTROESOPHAGEAL REFLUX DISEASE WITHOUT ESOPHAGITIS: ICD-10-CM

## 2023-01-11 DIAGNOSIS — E78.1 PURE HYPERGLYCERIDEMIA: Chronic | ICD-10-CM

## 2023-01-11 RX ORDER — PANTOPRAZOLE SODIUM 40 MG/1
TABLET, DELAYED RELEASE ORAL
Qty: 90 TABLET | Refills: 0 | Status: SHIPPED | OUTPATIENT
Start: 2023-01-11 | End: 2023-01-30 | Stop reason: SDUPTHER

## 2023-01-11 RX ORDER — FENOFIBRATE 145 MG/1
TABLET, COATED ORAL
Qty: 90 TABLET | Refills: 0 | Status: SHIPPED | OUTPATIENT
Start: 2023-01-11 | End: 2023-01-30 | Stop reason: SDUPTHER

## 2023-01-11 NOTE — TELEPHONE ENCOUNTER
Murphy Kennedy called requesting a refill on the following medications:  Requested Prescriptions     Pending Prescriptions Disp Refills    fenofibrate (TRICOR) 145 MG tablet [Pharmacy Med Name: FENOFIBRATE TABS 145MG] 90 tablet 3     Sig: TAKE 1 TABLET DAILY    pantoprazole (PROTONIX) 40 MG tablet [Pharmacy Med Name: PANTOPRAZOLE SODIUM DR TABS 40MG] 90 tablet 3     Sig: TAKE 1 TABLET DAILY       Date of last visit: 10/17/2022  Date of next visit (if applicable):1/30/2023  Date of last refill: 1/17/22  Pharmacy Name: [address]      Della Dumont MA

## 2023-01-30 ENCOUNTER — OFFICE VISIT (OUTPATIENT)
Dept: FAMILY MEDICINE CLINIC | Age: 68
End: 2023-01-30

## 2023-01-30 VITALS
WEIGHT: 195 LBS | HEART RATE: 61 BPM | OXYGEN SATURATION: 98 % | HEIGHT: 67 IN | DIASTOLIC BLOOD PRESSURE: 72 MMHG | SYSTOLIC BLOOD PRESSURE: 138 MMHG | TEMPERATURE: 98.4 F | BODY MASS INDEX: 30.61 KG/M2

## 2023-01-30 DIAGNOSIS — E78.1 PURE HYPERGLYCERIDEMIA: Chronic | ICD-10-CM

## 2023-01-30 DIAGNOSIS — H93.13 TINNITUS OF BOTH EARS: ICD-10-CM

## 2023-01-30 DIAGNOSIS — N39.0 RECURRENT URINARY TRACT INFECTION: Primary | ICD-10-CM

## 2023-01-30 DIAGNOSIS — K21.9 GASTROESOPHAGEAL REFLUX DISEASE WITHOUT ESOPHAGITIS: ICD-10-CM

## 2023-01-30 DIAGNOSIS — N18.31 STAGE 3A CHRONIC KIDNEY DISEASE (HCC): ICD-10-CM

## 2023-01-30 DIAGNOSIS — E78.2 MIXED HYPERLIPIDEMIA: Chronic | ICD-10-CM

## 2023-01-30 RX ORDER — METHENAMINE HIPPURATE 1000 MG/1
1 TABLET ORAL 2 TIMES DAILY WITH MEALS
Qty: 180 TABLET | Refills: 3 | Status: SHIPPED | OUTPATIENT
Start: 2023-01-30 | End: 2023-07-29

## 2023-01-30 RX ORDER — FENOFIBRATE 145 MG/1
TABLET, COATED ORAL
Qty: 90 TABLET | Refills: 3 | Status: SHIPPED | OUTPATIENT
Start: 2023-01-30

## 2023-01-30 RX ORDER — PANTOPRAZOLE SODIUM 40 MG/1
TABLET, DELAYED RELEASE ORAL
Qty: 90 TABLET | Refills: 3 | Status: SHIPPED | OUTPATIENT
Start: 2023-01-30

## 2023-01-30 SDOH — ECONOMIC STABILITY: FOOD INSECURITY: WITHIN THE PAST 12 MONTHS, YOU WORRIED THAT YOUR FOOD WOULD RUN OUT BEFORE YOU GOT MONEY TO BUY MORE.: NEVER TRUE

## 2023-01-30 SDOH — ECONOMIC STABILITY: FOOD INSECURITY: WITHIN THE PAST 12 MONTHS, THE FOOD YOU BOUGHT JUST DIDN'T LAST AND YOU DIDN'T HAVE MONEY TO GET MORE.: NEVER TRUE

## 2023-01-30 ASSESSMENT — PATIENT HEALTH QUESTIONNAIRE - PHQ9
SUM OF ALL RESPONSES TO PHQ QUESTIONS 1-9: 0
2. FEELING DOWN, DEPRESSED OR HOPELESS: 0
SUM OF ALL RESPONSES TO PHQ QUESTIONS 1-9: 0
1. LITTLE INTEREST OR PLEASURE IN DOING THINGS: 0
SUM OF ALL RESPONSES TO PHQ9 QUESTIONS 1 & 2: 0

## 2023-01-30 ASSESSMENT — SOCIAL DETERMINANTS OF HEALTH (SDOH): HOW HARD IS IT FOR YOU TO PAY FOR THE VERY BASICS LIKE FOOD, HOUSING, MEDICAL CARE, AND HEATING?: NOT HARD AT ALL

## 2023-01-30 NOTE — PROGRESS NOTES
Rosalio Arguello (:  1955) is a 75 y.o. female,Established patient, here for evaluation of the following chief complaint(s):  6 Month Follow-Up (Questions about ears ringing)         ASSESSMENT/PLAN:  1. Recurrent urinary tract infection  -     methenamine (HIPREX) 1 g tablet; Take 1 tablet by mouth 2 times daily (with meals), Disp-180 tablet, R-3Normal  2. Pure hyperglyceridemia  -     fenofibrate (TRICOR) 145 MG tablet; TAKE 1 TABLET DAILY, Disp-90 tablet, R-3Normal  3. Gastroesophageal reflux disease without esophagitis  -     pantoprazole (PROTONIX) 40 MG tablet; TAKE 1 TABLET DAILY, Disp-90 tablet, R-3Normal  4. Stage 3a chronic kidney disease ([facility] [address]  5. Mixed hyperlipidemia  6. Tinnitus of both ears  -     External Referral To Audiology    Patient overall doing well. Will continue on current medication. She prefers to do laboratories in [place] so we will await those. We will evaluate hearing with audiology for the ringing of the ear and see their recommendations. May consider seeing an ENT doctor. Otherwise encourage healthy diet and exercise. Discussed vitamins. At this point vitamin C and zinc may be used as needed during colds but not needed daily. Biotin is also questionable but patient believes that it has helped her hair and nails. Return in about 6 months (around 2023) for AWV. Subjective   SUBJECTIVE/OBJECTIVE:  HPI  Patient reports doing overall well. She is having ringing of the ears and appears to be both sides. She does not think she is having hearing difficulty but the ringing is annoying. Does not always have high-pitched sounds. Interestingly, she has history of tympanic membrane repair. She denies any pain in the ears or any drainage. No history of upper respiratory congestion lately. She is not aware of any significant loud noise exposure over time. May be growing up on the weekends with some music.   Otherwise her urinary tract infections seem to be well controlled with a combination of Hiprex and good water intake. Patient's reflux is well controlled. And she currently takes Tricor for high triglycerides. She is trying to follow a healthy diet and sometimes gets constipated but has been doing plum juice which seems to work for her. Laboratories are to be done at the lab fair in [facility]. Review of Systems   Constitutional:  Negative for fatigue and fever. Respiratory:  Negative for shortness of breath. Cardiovascular:  Negative for chest pain and leg swelling. Objective   Physical Exam  Constitutional:       General: She is not in acute distress. Appearance: Normal appearance. She is not ill-appearing. Cardiovascular:      Rate and Rhythm: Normal rate and regular rhythm. Heart sounds: No murmur heard. Pulmonary:      Effort: Pulmonary effort is normal. No respiratory distress. Breath sounds: Normal breath sounds. No wheezing. Musculoskeletal:         General: No swelling. Neurological:      Mental Status: She is alert.    Psychiatric:         Mood and Affect: Mood normal.        Lab Results   Component Value Date    CHOL 143 05/14/2022    CHOL 162 10/09/2021    CHOL 165 01/28/2021     Lab Results   Component Value Date    TRIG 109 05/14/2022    TRIG 116 10/09/2021    TRIG 99 01/28/2021     Lab Results   Component Value Date    HDL 48 05/14/2022    HDL 50 10/09/2021    HDL 56 01/28/2021     Lab Results   Component Value Date    LDLCALC 73 05/14/2022    LDLCALC 89 10/09/2021    LDLCALC 89 01/28/2021     Lab Results   Component Value Date    VLDL 22 05/14/2022    VLDL 23 10/09/2021    VLDL 22 05/11/2019     Lab Results   Component Value Date    CHOLHDLRATIO 1.5 05/14/2022    CHOLHDLRATIO 1.8 10/09/2021     Lab Results   Component Value Date     05/14/2022    K 4.4 05/14/2022     05/14/2022    CO2 30 05/14/2022    BUN 20 05/14/2022    CREATININE 1.1 05/14/2022    GLUCOSE 105 05/14/2022    CALCIUM 9.9 05/14/2022 PROT 7.2 01/12/2021    LABALBU 4.3 05/14/2022    BILITOT 0.5 05/14/2022    ALKPHOS 44 05/14/2022    AST 19 05/14/2022    ALT 17 05/14/2022    LABGLOM 50 05/14/2022    AGRATIO 4.0 (H) 05/14/2016         This office note may have been at least partially dictated. Effort was made to review for errors but some may have been missed. Please contact Latia Florez of [employer] for clarification if needed. An electronic signature was used to authenticate this note.     --Sunil Schneider MD

## 2023-02-05 PROBLEM — H93.13 TINNITUS OF BOTH EARS: Status: ACTIVE | Noted: 2023-02-05

## 2023-02-05 ASSESSMENT — ENCOUNTER SYMPTOMS: SHORTNESS OF BREATH: 0

## 2023-05-22 LAB
ALBUMIN SERPL-MCNC: 4.7 G/DL
ALP BLD-CCNC: 40 U/L
ALT SERPL-CCNC: 19 U/L
ANION GAP SERPL CALCULATED.3IONS-SCNC: 2.2 MMOL/L
AST SERPL-CCNC: 20 U/L
BASOPHILS ABSOLUTE: 0.1 /ΜL
BASOPHILS RELATIVE PERCENT: 1.5 %
BILIRUB SERPL-MCNC: 0.5 MG/DL (ref 0.1–1.4)
BUN BLDV-MCNC: 21 MG/DL
CALCIUM SERPL-MCNC: 10 MG/DL
CHLORIDE BLD-SCNC: 105 MMOL/L
CHOLESTEROL, TOTAL: 155 MG/DL
CHOLESTEROL/HDL RATIO: 1.3
CO2: 30 MMOL/L
CREAT SERPL-MCNC: 1 MG/DL
EGFR: 55
EOSINOPHILS ABSOLUTE: 0.2 /ΜL
EOSINOPHILS RELATIVE PERCENT: 3.2 %
GLUCOSE BLD-MCNC: 111 MG/DL
HCT VFR BLD CALC: 38.9 % (ref 36–46)
HDLC SERPL-MCNC: 56 MG/DL (ref 35–70)
HEMOGLOBIN: 13.2 G/DL (ref 12–16)
LDL CHOLESTEROL CALCULATED: 73 MG/DL (ref 0–160)
LYMPHOCYTES ABSOLUTE: 2.4 /ΜL
LYMPHOCYTES RELATIVE PERCENT: 45.8 %
MCH RBC QN AUTO: 31.6 PG
MCHC RBC AUTO-ENTMCNC: 33.9 G/DL
MCV RBC AUTO: 93.3 FL
MONOCYTES ABSOLUTE: 0.4 /ΜL
MONOCYTES RELATIVE PERCENT: 7.5 %
NEUTROPHILS ABSOLUTE: 2.2 /ΜL
NEUTROPHILS RELATIVE PERCENT: 42 %
NONHDLC SERPL-MCNC: NORMAL MG/DL
PDW BLD-RTO: 12.8 %
PHOSPHORUS: 3.9 MG/DL
PLATELET # BLD: 286 K/ΜL
PMV BLD AUTO: 9.2 FL
POTASSIUM SERPL-SCNC: 4.2 MMOL/L
RBC # BLD: 4.17 10^6/ΜL
SODIUM BLD-SCNC: 142 MMOL/L
TOTAL PROTEIN: 6.8
TRIGL SERPL-MCNC: 128 MG/DL
TSH SERPL DL<=0.05 MIU/L-ACNC: 1.69 UIU/ML
VLDLC SERPL CALC-MCNC: 26 MG/DL
WBC # BLD: 5.2 10^3/ML

## 2023-08-02 ENCOUNTER — OFFICE VISIT (OUTPATIENT)
Dept: FAMILY MEDICINE CLINIC | Age: 68
End: 2023-08-02

## 2023-08-02 ENCOUNTER — HOSPITAL ENCOUNTER (OUTPATIENT)
Age: 68
Discharge: HOME | End: 2023-08-02
Payer: MEDICARE

## 2023-08-02 VITALS
HEIGHT: 67 IN | BODY MASS INDEX: 30.92 KG/M2 | HEART RATE: 71 BPM | OXYGEN SATURATION: 95 % | DIASTOLIC BLOOD PRESSURE: 92 MMHG | TEMPERATURE: 97.4 F | WEIGHT: 197 LBS | SYSTOLIC BLOOD PRESSURE: 156 MMHG

## 2023-08-02 DIAGNOSIS — R03.0 ELEVATED BLOOD PRESSURE READING: ICD-10-CM

## 2023-08-02 DIAGNOSIS — Z23 NEED FOR VACCINATION: ICD-10-CM

## 2023-08-02 DIAGNOSIS — M25.572 CHRONIC PAIN OF LEFT ANKLE: ICD-10-CM

## 2023-08-02 DIAGNOSIS — G89.29 CHRONIC PAIN OF LEFT ANKLE: ICD-10-CM

## 2023-08-02 DIAGNOSIS — R73.09 ELEVATED GLUCOSE: ICD-10-CM

## 2023-08-02 DIAGNOSIS — Z00.00 MEDICARE ANNUAL WELLNESS VISIT, SUBSEQUENT: Primary | ICD-10-CM

## 2023-08-02 DIAGNOSIS — M54.2 NECK PAIN: ICD-10-CM

## 2023-08-02 PROCEDURE — 36415 COLL VENOUS BLD VENIPUNCTURE: CPT

## 2023-08-02 PROCEDURE — 83036 HEMOGLOBIN GLYCOSYLATED A1C: CPT

## 2023-08-02 RX ORDER — ASCORBIC ACID 500 MG
500 TABLET ORAL DAILY
COMMUNITY

## 2023-08-02 RX ORDER — ZINC GLUCONATE 50 MG
50 TABLET ORAL DAILY
COMMUNITY

## 2023-08-02 SDOH — ECONOMIC STABILITY: INCOME INSECURITY: HOW HARD IS IT FOR YOU TO PAY FOR THE VERY BASICS LIKE FOOD, HOUSING, MEDICAL CARE, AND HEATING?: NOT HARD AT ALL

## 2023-08-02 SDOH — ECONOMIC STABILITY: FOOD INSECURITY: WITHIN THE PAST 12 MONTHS, YOU WORRIED THAT YOUR FOOD WOULD RUN OUT BEFORE YOU GOT MONEY TO BUY MORE.: NEVER TRUE

## 2023-08-02 SDOH — ECONOMIC STABILITY: HOUSING INSECURITY
IN THE LAST 12 MONTHS, WAS THERE A TIME WHEN YOU DID NOT HAVE A STEADY PLACE TO SLEEP OR SLEPT IN A SHELTER (INCLUDING NOW)?: NO

## 2023-08-02 SDOH — ECONOMIC STABILITY: FOOD INSECURITY: WITHIN THE PAST 12 MONTHS, THE FOOD YOU BOUGHT JUST DIDN'T LAST AND YOU DIDN'T HAVE MONEY TO GET MORE.: NEVER TRUE

## 2023-08-02 ASSESSMENT — PATIENT HEALTH QUESTIONNAIRE - PHQ9
SUM OF ALL RESPONSES TO PHQ9 QUESTIONS 1 & 2: 0
SUM OF ALL RESPONSES TO PHQ QUESTIONS 1-9: 0
SUM OF ALL RESPONSES TO PHQ QUESTIONS 1-9: 0
2. FEELING DOWN, DEPRESSED OR HOPELESS: 0
1. LITTLE INTEREST OR PLEASURE IN DOING THINGS: 0
SUM OF ALL RESPONSES TO PHQ QUESTIONS 1-9: 0
SUM OF ALL RESPONSES TO PHQ QUESTIONS 1-9: 0

## 2023-08-02 ASSESSMENT — LIFESTYLE VARIABLES
HOW MANY STANDARD DRINKS CONTAINING ALCOHOL DO YOU HAVE ON A TYPICAL DAY: 1 OR 2
HOW OFTEN DO YOU HAVE A DRINK CONTAINING ALCOHOL: NEVER

## 2023-08-02 NOTE — PATIENT INSTRUCTIONS
deductible, co-insurance, and/or copay. Some of these benefits include a comprehensive review of your medical history including lifestyle, illnesses that may run in your family, and various assessments and screenings as appropriate. After reviewing your medical record and screening and assessments performed today your provider may have ordered immunizations, labs, imaging, and/or referrals for you. A list of these orders (if applicable) as well as your Preventive Care list are included within your After Visit Summary for your review. Other Preventive Recommendations:    A preventive eye exam performed by an eye specialist is recommended every 1-2 years to screen for glaucoma; cataracts, macular degeneration, and other eye disorders. A preventive dental visit is recommended every 6 months. Try to get at least 150 minutes of exercise per week or 10,000 steps per day on a pedometer . Order or download the FREE \"Exercise & Physical Activity: Your Everyday Guide\" from [employer]. Call 0-746.841.8052 or search [employer] online. You need 7280-3450 mg of calcium and 3585-7152 IU of vitamin D per day. It is possible to meet your calcium requirement with diet alone, but a vitamin D supplement is usually necessary to meet this goal.  When exposed to the sun, use a sunscreen that protects against both UVA and UVB radiation with an SPF of 30 or greater. Reapply every 2 to 3 hours or after sweating, drying off with a towel, or swimming. Always wear a seat belt when traveling in a car. Always wear a helmet when riding a bicycle or motorcycle.

## 2023-08-03 ENCOUNTER — TELEPHONE (OUTPATIENT)
Dept: FAMILY MEDICINE CLINIC | Age: 68
End: 2023-08-03

## 2023-08-03 LAB
DEPRECATED MEAN GLUCOSE BLD GHB EST-ACNC: 117 MG/DL (ref 70–126)
HBA1C MFR BLD HPLC: 5.9 % (ref 4.4–6.4)

## 2023-08-03 NOTE — TELEPHONE ENCOUNTER
Patient informed and will start with diet changes and exercise. Offered an OV to discuss with PCP and patient declined at this time.

## 2023-08-03 NOTE — TELEPHONE ENCOUNTER
----- Message from Debbie Spence MD sent at 8/3/2023  8:17 AM EDT -----  Please let patient know that her A1c shows prediabetes. This can be reversed with a healthy diet that is focused on a plant-forward whole foods diet with reduction of added sugars and processed foods. A low-carb mediterranean diet is a good lifestyle to consider along with good exercise levels. If she has questions on this, an office visit would be ideal to discuss. Otherwise, we'll keep an eye on this every 6 months as without changes it can progress to diabetes.

## 2023-08-03 NOTE — RESULT ENCOUNTER NOTE
Please let patient know that her A1c shows prediabetes. This can be reversed with a healthy diet that is focused on a plant-forward whole foods diet with reduction of added sugars and processed foods. A low-carb mediterranean diet is a good lifestyle to consider along with good exercise levels. If she has questions on this, an office visit would be ideal to discuss. Otherwise, we'll keep an eye on this every 6 months as without changes it can progress to diabetes.

## 2023-10-18 ENCOUNTER — HOSPITAL ENCOUNTER (OUTPATIENT)
Dept: MAMMOGRAPHY | Age: 68
Discharge: HOME | End: 2023-10-18
Payer: MEDICARE

## 2023-10-18 DIAGNOSIS — Z12.39 BREAST SCREENING: ICD-10-CM

## 2023-10-18 PROCEDURE — 77063 BREAST TOMOSYNTHESIS BI: CPT

## 2023-10-26 ENCOUNTER — TELEPHONE (OUTPATIENT)
Dept: FAMILY MEDICINE CLINIC | Age: 68
End: 2023-10-26

## 2023-10-26 NOTE — TELEPHONE ENCOUNTER
Patient informed lab results show borderline sugars. Educated patient on watching sugar intake and increasing activity.

## 2024-02-02 ENCOUNTER — OFFICE VISIT (OUTPATIENT)
Dept: FAMILY MEDICINE CLINIC | Age: 69
End: 2024-02-02

## 2024-02-02 VITALS
BODY MASS INDEX: 30.61 KG/M2 | RESPIRATION RATE: 16 BRPM | OXYGEN SATURATION: 97 % | TEMPERATURE: 98 F | WEIGHT: 195 LBS | HEART RATE: 67 BPM | SYSTOLIC BLOOD PRESSURE: 138 MMHG | HEIGHT: 67 IN | DIASTOLIC BLOOD PRESSURE: 86 MMHG

## 2024-02-02 DIAGNOSIS — E78.1 HIGH TRIGLYCERIDES: ICD-10-CM

## 2024-02-02 DIAGNOSIS — Z00.00 MEDICARE ANNUAL WELLNESS VISIT, SUBSEQUENT: Primary | ICD-10-CM

## 2024-02-02 DIAGNOSIS — K21.9 GASTROESOPHAGEAL REFLUX DISEASE WITHOUT ESOPHAGITIS: ICD-10-CM

## 2024-02-02 DIAGNOSIS — G89.29 CHRONIC PAIN OF LEFT ANKLE: ICD-10-CM

## 2024-02-02 DIAGNOSIS — N39.0 RECURRENT URINARY TRACT INFECTION: ICD-10-CM

## 2024-02-02 DIAGNOSIS — M25.572 CHRONIC PAIN OF LEFT ANKLE: ICD-10-CM

## 2024-02-02 PROBLEM — N18.30 CHRONIC RENAL DISEASE, STAGE III (HCC): Status: RESOLVED | Noted: 2022-11-30 | Resolved: 2024-02-02

## 2024-02-02 RX ORDER — FENOFIBRATE 145 MG/1
TABLET, COATED ORAL
Qty: 90 TABLET | Refills: 3 | Status: SHIPPED | OUTPATIENT
Start: 2024-02-02 | End: 2024-02-02

## 2024-02-02 RX ORDER — FENOFIBRATE 145 MG/1
TABLET, COATED ORAL
Qty: 90 TABLET | Refills: 3 | Status: SHIPPED | OUTPATIENT
Start: 2024-02-02

## 2024-02-02 RX ORDER — PANTOPRAZOLE SODIUM 40 MG/1
TABLET, DELAYED RELEASE ORAL
Qty: 90 TABLET | Refills: 3 | Status: SHIPPED | OUTPATIENT
Start: 2024-02-02

## 2024-02-02 RX ORDER — PANTOPRAZOLE SODIUM 40 MG/1
TABLET, DELAYED RELEASE ORAL
Qty: 90 TABLET | Refills: 3 | Status: SHIPPED | OUTPATIENT
Start: 2024-02-02 | End: 2024-02-02

## 2024-02-02 ASSESSMENT — LIFESTYLE VARIABLES
HOW OFTEN DO YOU HAVE A DRINK CONTAINING ALCOHOL: MONTHLY OR LESS
HOW MANY STANDARD DRINKS CONTAINING ALCOHOL DO YOU HAVE ON A TYPICAL DAY: 1 OR 2

## 2024-02-02 NOTE — PROGRESS NOTES
outside providers/suppliers regularly involved in providing care):   Patient Care Team:  Nurys Salguero MD as PCP - General (Family Medicine)  Nurys Salguero MD as PCP - Empaneled Provider     Reviewed and updated this visit:  Tobacco  Allergies  Meds  Problems  Med Hx  Surg Hx  Soc Hx  Fam Hx

## 2024-07-12 ENCOUNTER — HOSPITAL ENCOUNTER (OUTPATIENT)
Age: 69
Discharge: HOME | End: 2024-07-12
Payer: MEDICARE

## 2024-07-12 ENCOUNTER — TELEPHONE (OUTPATIENT)
Dept: FAMILY MEDICINE CLINIC | Age: 69
End: 2024-07-12

## 2024-07-12 ENCOUNTER — HOSPITAL ENCOUNTER (OUTPATIENT)
Dept: GENERAL RADIOLOGY | Age: 69
Discharge: HOME | End: 2024-07-12
Payer: MEDICARE

## 2024-07-12 ENCOUNTER — OFFICE VISIT (OUTPATIENT)
Dept: FAMILY MEDICINE CLINIC | Age: 69
End: 2024-07-12

## 2024-07-12 VITALS
HEART RATE: 66 BPM | BODY MASS INDEX: 31.01 KG/M2 | WEIGHT: 197.6 LBS | TEMPERATURE: 97.3 F | HEIGHT: 67 IN | RESPIRATION RATE: 16 BRPM | OXYGEN SATURATION: 97 % | DIASTOLIC BLOOD PRESSURE: 80 MMHG | SYSTOLIC BLOOD PRESSURE: 132 MMHG

## 2024-07-12 DIAGNOSIS — G89.29 CHRONIC PAIN OF LEFT ANKLE: ICD-10-CM

## 2024-07-12 DIAGNOSIS — N39.0 RECURRENT URINARY TRACT INFECTION: Primary | ICD-10-CM

## 2024-07-12 DIAGNOSIS — N18.31 CHRONIC KIDNEY DISEASE, STAGE 3A (HCC): ICD-10-CM

## 2024-07-12 DIAGNOSIS — R22.41 ANKLE MASS, RIGHT: ICD-10-CM

## 2024-07-12 DIAGNOSIS — R79.9 ABNORMAL FINDING OF BLOOD CHEMISTRY, UNSPECIFIED: ICD-10-CM

## 2024-07-12 DIAGNOSIS — M25.572 CHRONIC PAIN OF LEFT ANKLE: ICD-10-CM

## 2024-07-12 DIAGNOSIS — R00.2 PALPITATIONS: ICD-10-CM

## 2024-07-12 DIAGNOSIS — E78.2 MIXED HYPERLIPIDEMIA: Chronic | ICD-10-CM

## 2024-07-12 LAB
BILIRUBIN, POC: NEGATIVE
BLOOD URINE, POC: NEGATIVE
CLARITY, POC: ABNORMAL
COLOR, POC: YELLOW
GLUCOSE URINE, POC: NEGATIVE
KETONES, POC: NEGATIVE
LEUKOCYTE EST, POC: NEGATIVE
NITRITE, POC: POSITIVE
PH, POC: 7
PROTEIN, POC: NEGATIVE
SPECIFIC GRAVITY, POC: 1.02
UROBILINOGEN, POC: 0.2

## 2024-07-12 PROCEDURE — 73610 X-RAY EXAM OF ANKLE: CPT

## 2024-07-12 RX ORDER — METHENAMINE HIPPURATE 1000 MG/1
1 TABLET ORAL 2 TIMES DAILY WITH MEALS
Qty: 180 TABLET | Refills: 3 | Status: SHIPPED | OUTPATIENT
Start: 2024-07-12 | End: 2025-07-12

## 2024-07-12 RX ORDER — CIPROFLOXACIN 250 MG/1
250 TABLET, FILM COATED ORAL 2 TIMES DAILY
Qty: 6 TABLET | Refills: 0 | Status: SHIPPED | OUTPATIENT
Start: 2024-07-12 | End: 2024-07-15

## 2024-07-12 NOTE — TELEPHONE ENCOUNTER
----- Message from DARREL Golden - CNP sent at 7/12/2024 12:44 PM EDT -----  No acute changes on xray. Shows the swelling. Use ice and the diclofenac gel. Elevate when able and avoid aggravating activities. Call if no improvement in 2 weeks.

## 2024-07-12 NOTE — PROGRESS NOTES
Dulce Limon (: 1955) is a 69 y.o. female is here for evaluation of the following chief complaint(s): Urinary Frequency (Urinary frequency, and odor for about a month )    Assessment/Plan:   1. Recurrent urinary tract infection  -     POCT Urinalysis No Micro (Auto)  -     methenamine (HIPREX) 1 g tablet; Take 1 tablet by mouth 2 times daily (with meals), Disp-180 tablet, R-3Normal  -     ciprofloxacin (CIPRO) 250 MG tablet; Take 1 tablet by mouth 2 times daily for 3 days, Disp-6 tablet, R-0Normal  -     Culture, Urine  -     Comprehensive Metabolic Panel; Future  -     CBC with Auto Differential; Future  -     Lipid Panel; Future  -     TSH with Reflex; Future  -     Hemoglobin A1C; Future  2. Chronic kidney disease, stage 3a (HCC)  -     Comprehensive Metabolic Panel; Future  -     CBC with Auto Differential; Future  -     Lipid Panel; Future  -     TSH with Reflex; Future  -     Hemoglobin A1C; Future  3. Ankle mass, right  -     XR ANKLE RIGHT (MIN 3 VIEWS); Future  -     Comprehensive Metabolic Panel; Future  -     CBC with Auto Differential; Future  -     Lipid Panel; Future  -     TSH with Reflex; Future  -     Hemoglobin A1C; Future  4. Chronic pain of left ankle  -     diclofenac sodium (VOLTAREN) 1 % GEL; Apply 4 g topically 4 times daily, Topical, 4 TIMES DAILY Starting 2024, Disp-400 g, R-5, Normal  -     Comprehensive Metabolic Panel; Future  -     CBC with Auto Differential; Future  -     Lipid Panel; Future  -     TSH with Reflex; Future  -     Hemoglobin A1C; Future  5. Mixed hyperlipidemia  -     Comprehensive Metabolic Panel; Future  -     CBC with Auto Differential; Future  -     Lipid Panel; Future  -     TSH with Reflex; Future  -     Hemoglobin A1C; Future  6. Palpitations  -     Comprehensive Metabolic Panel; Future  -     CBC with Auto Differential; Future  -     Lipid Panel; Future  -     TSH with Reflex; Future  -     Hemoglobin A1C; Future  7. Abnormal finding of blood

## 2024-07-14 LAB
BACTERIA UR CULT: ABNORMAL
ORGANISM: ABNORMAL

## 2024-07-15 ENCOUNTER — TELEPHONE (OUTPATIENT)
Dept: FAMILY MEDICINE CLINIC | Age: 69
End: 2024-07-15

## 2024-07-15 DIAGNOSIS — M25.572 CHRONIC PAIN OF LEFT ANKLE: ICD-10-CM

## 2024-07-15 DIAGNOSIS — G89.29 CHRONIC PAIN OF LEFT ANKLE: ICD-10-CM

## 2024-07-18 ENCOUNTER — HOSPITAL ENCOUNTER (OUTPATIENT)
Age: 69
Discharge: HOME | End: 2024-07-18
Payer: MEDICARE

## 2024-07-18 DIAGNOSIS — N39.0 RECURRENT URINARY TRACT INFECTION: ICD-10-CM

## 2024-07-18 DIAGNOSIS — G89.29 CHRONIC PAIN OF LEFT ANKLE: ICD-10-CM

## 2024-07-18 DIAGNOSIS — N18.31 CHRONIC KIDNEY DISEASE, STAGE 3A (HCC): ICD-10-CM

## 2024-07-18 DIAGNOSIS — R00.2 PALPITATIONS: ICD-10-CM

## 2024-07-18 DIAGNOSIS — R22.41 ANKLE MASS, RIGHT: ICD-10-CM

## 2024-07-18 DIAGNOSIS — M25.572 CHRONIC PAIN OF LEFT ANKLE: ICD-10-CM

## 2024-07-18 DIAGNOSIS — E78.2 MIXED HYPERLIPIDEMIA: Chronic | ICD-10-CM

## 2024-07-18 DIAGNOSIS — R79.9 ABNORMAL FINDING OF BLOOD CHEMISTRY, UNSPECIFIED: ICD-10-CM

## 2024-07-18 LAB
ALBUMIN SERPL BCG-MCNC: 4.5 G/DL (ref 3.5–5.1)
ALP SERPL-CCNC: 47 U/L (ref 38–126)
ALT SERPL W/O P-5'-P-CCNC: 22 U/L (ref 11–66)
ANION GAP SERPL CALC-SCNC: 12 MEQ/L (ref 8–16)
AST SERPL-CCNC: 34 U/L (ref 5–40)
BASOPHILS ABSOLUTE: 0.1 THOU/MM3 (ref 0–0.1)
BASOPHILS NFR BLD AUTO: 1.5 %
BILIRUB SERPL-MCNC: 0.5 MG/DL (ref 0.3–1.2)
BUN SERPL-MCNC: 17 MG/DL (ref 7–22)
CALCIUM SERPL-MCNC: 9.3 MG/DL (ref 8.5–10.5)
CHLORIDE SERPL-SCNC: 105 MEQ/L (ref 98–111)
CHOLEST SERPL-MCNC: 156 MG/DL (ref 100–199)
CO2 SERPL-SCNC: 27 MEQ/L (ref 23–33)
CREAT SERPL-MCNC: 0.9 MG/DL (ref 0.4–1.2)
DEPRECATED RDW RBC AUTO: 43.1 FL (ref 35–45)
EOSINOPHIL NFR BLD AUTO: 3.1 %
EOSINOPHILS ABSOLUTE: 0.1 THOU/MM3 (ref 0–0.4)
ERYTHROCYTE [DISTWIDTH] IN BLOOD BY AUTOMATED COUNT: 12.1 % (ref 11.5–14.5)
GFR SERPL CREATININE-BSD FRML MDRD: 69 ML/MIN/1.73M2
GLUCOSE SERPL-MCNC: 101 MG/DL (ref 70–108)
HCT VFR BLD AUTO: 40.1 % (ref 37–47)
HDLC SERPL-MCNC: 47 MG/DL
HGB BLD-MCNC: 12.9 GM/DL (ref 12–16)
IMM GRANULOCYTES # BLD AUTO: 0.01 THOU/MM3 (ref 0–0.07)
IMM GRANULOCYTES NFR BLD AUTO: 0.2 %
LDLC SERPL CALC-MCNC: 84 MG/DL
LYMPHOCYTES ABSOLUTE: 1.8 THOU/MM3 (ref 1–4.8)
LYMPHOCYTES NFR BLD AUTO: 37.9 %
MCH RBC QN AUTO: 31.1 PG (ref 26–33)
MCHC RBC AUTO-ENTMCNC: 32.2 GM/DL (ref 32.2–35.5)
MCV RBC AUTO: 96.6 FL (ref 81–99)
MONOCYTES ABSOLUTE: 0.4 THOU/MM3 (ref 0.4–1.3)
MONOCYTES NFR BLD AUTO: 8.5 %
NEUTROPHILS ABSOLUTE: 2.3 THOU/MM3 (ref 1.8–7.7)
NEUTROPHILS NFR BLD AUTO: 48.8 %
NRBC BLD AUTO-RTO: 0 /100 WBC
PLATELET # BLD AUTO: 278 THOU/MM3 (ref 130–400)
PMV BLD AUTO: 11.7 FL (ref 9.4–12.4)
POTASSIUM SERPL-SCNC: 4.3 MEQ/L (ref 3.5–5.2)
PROT SERPL-MCNC: 6.7 G/DL (ref 6.1–8)
RBC # BLD AUTO: 4.15 MILL/MM3 (ref 4.2–5.4)
SODIUM SERPL-SCNC: 144 MEQ/L (ref 135–145)
TRIGL SERPL-MCNC: 127 MG/DL (ref 0–199)
TSH SERPL DL<=0.005 MIU/L-ACNC: 1.2 UIU/ML (ref 0.4–4.2)
WBC # BLD AUTO: 4.8 THOU/MM3 (ref 4.8–10.8)

## 2024-07-18 PROCEDURE — 80053 COMPREHEN METABOLIC PANEL: CPT

## 2024-07-18 PROCEDURE — 80061 LIPID PANEL: CPT

## 2024-07-18 PROCEDURE — 85025 COMPLETE CBC W/AUTO DIFF WBC: CPT

## 2024-07-18 PROCEDURE — 84443 ASSAY THYROID STIM HORMONE: CPT

## 2024-07-18 PROCEDURE — 36415 COLL VENOUS BLD VENIPUNCTURE: CPT

## 2024-07-18 PROCEDURE — 83036 HEMOGLOBIN GLYCOSYLATED A1C: CPT

## 2024-07-19 LAB
DEPRECATED MEAN GLUCOSE BLD GHB EST-ACNC: 114 MG/DL (ref 70–126)
HBA1C MFR BLD HPLC: 5.8 % (ref 4.4–6.4)

## 2024-07-23 ENCOUNTER — TELEPHONE (OUTPATIENT)
Dept: FAMILY MEDICINE CLINIC | Age: 69
End: 2024-07-23

## 2024-07-23 NOTE — TELEPHONE ENCOUNTER
----- Message from DARREL Golden - CNP sent at 7/23/2024  8:47 AM EDT -----  Your labs look good. Kidney and liver function is normal. Blood counts are stable. Cholesterol numbers are at goal. A1C is 5.8 and high normal. Thyroid lab is normal.

## 2024-07-24 ENCOUNTER — OFFICE VISIT (OUTPATIENT)
Dept: FAMILY MEDICINE CLINIC | Age: 69
End: 2024-07-24

## 2024-07-24 VITALS
RESPIRATION RATE: 18 BRPM | WEIGHT: 199 LBS | HEART RATE: 76 BPM | OXYGEN SATURATION: 98 % | DIASTOLIC BLOOD PRESSURE: 70 MMHG | HEIGHT: 67 IN | SYSTOLIC BLOOD PRESSURE: 128 MMHG | BODY MASS INDEX: 31.23 KG/M2

## 2024-07-24 DIAGNOSIS — N39.0 RECURRENT URINARY TRACT INFECTION: Primary | ICD-10-CM

## 2024-07-24 LAB
BILIRUBIN, POC: NEGATIVE
BLOOD URINE, POC: NEGATIVE
CLARITY, POC: CLEAR
COLOR, POC: YELLOW
GLUCOSE URINE, POC: NEGATIVE
KETONES, POC: NEGATIVE
LEUKOCYTE EST, POC: NORMAL
NITRITE, POC: NEGATIVE
PH, POC: 6.5
PROTEIN, POC: NEGATIVE
SPECIFIC GRAVITY, POC: 1.02
UROBILINOGEN, POC: 0.2

## 2024-07-24 RX ORDER — AMOXICILLIN AND CLAVULANATE POTASSIUM 875; 125 MG/1; MG/1
1 TABLET, FILM COATED ORAL 2 TIMES DAILY
Qty: 14 TABLET | Refills: 0 | Status: SHIPPED | OUTPATIENT
Start: 2024-07-24 | End: 2024-07-31

## 2024-07-24 ASSESSMENT — ENCOUNTER SYMPTOMS
SORE THROAT: 0
COUGH: 0
CHEST TIGHTNESS: 0
ABDOMINAL PAIN: 0
SHORTNESS OF BREATH: 0
NAUSEA: 0
EYE PAIN: 0
VOMITING: 0

## 2024-07-24 NOTE — PROGRESS NOTES
[facility] PROFESSION[facility] MEDICINE  [address]  [facility] [address]  Dept: [phone]  Loc: [phone]     Dulce Limon (:  1955) is a 69 y.o. female, here for evaluation of the following chief complaint(s):  Urinary Frequency      ASSESSMENT/PLAN:  1. Recurrent urinary tract infection  -     POCT Urinalysis No Micro (Auto)  -     amoxicillin-clavulanate (AUGMENTIN) 875-125 MG per tablet; Take 1 tablet by mouth 2 times daily for 7 days, Disp-14 tablet, R-0Normal    Symptoms still present. Will start Augmentin. Consider referral to urology if no resolution. Patient only able to give very small urine sample.    Return for Regular follow up as scheduled and as needed.    SUBJECTIVE/OBJECTIVE:  Patient states she feels like the UTI just \"never went completely away.\"    Urinary Frequency   This is a recurrent problem. The current episode started more than 1 month ago. The problem occurs every urination. The problem has been unchanged. The patient is experiencing no pain. There has been no fever. Associated symptoms include frequency and urgency. Pertinent negatives include no chills, discharge, flank pain, hematuria, hesitancy, nausea, possible pregnancy, sweats or vomiting. Treatments tried: ciprofloxacin. The treatment provided moderate relief. Her past medical history is significant for recurrent UTIs.       Review of Systems   Constitutional:  Negative for chills and fever.   HENT:  Negative for congestion and sore throat.    Eyes:  Negative for pain and visual disturbance.   Respiratory:  Negative for cough, chest tightness and shortness of breath.    Cardiovascular:  Negative for chest pain and palpitations.   Gastrointestinal:  Negative for abdominal pain, nausea and vomiting.   Genitourinary:  Positive for frequency and urgency. Negative for decreased urine volume, dysuria, flank pain, hematuria and hesitancy.   Skin:  Negative for rash.

## 2024-08-07 ENCOUNTER — OFFICE VISIT (OUTPATIENT)
Dept: FAMILY MEDICINE CLINIC | Age: 69
End: 2024-08-07

## 2024-08-07 VITALS
SYSTOLIC BLOOD PRESSURE: 130 MMHG | RESPIRATION RATE: 18 BRPM | HEART RATE: 65 BPM | HEIGHT: 67 IN | WEIGHT: 198 LBS | OXYGEN SATURATION: 96 % | DIASTOLIC BLOOD PRESSURE: 64 MMHG | BODY MASS INDEX: 31.08 KG/M2

## 2024-08-07 DIAGNOSIS — M25.571 ACUTE RIGHT ANKLE PAIN: ICD-10-CM

## 2024-08-07 DIAGNOSIS — Z71.82 EXERCISE COUNSELING: ICD-10-CM

## 2024-08-07 DIAGNOSIS — Z76.89 ENCOUNTER FOR WEIGHT MANAGEMENT: ICD-10-CM

## 2024-08-07 DIAGNOSIS — M76.61 ACHILLES BURSITIS OF RIGHT LOWER EXTREMITY: Primary | ICD-10-CM

## 2024-08-07 DIAGNOSIS — Z71.3 DIETARY COUNSELING: ICD-10-CM

## 2024-08-07 RX ORDER — POTASSIUM CHLORIDE 7.45 MG/ML
10 INJECTION INTRAVENOUS ONCE
COMMUNITY

## 2024-08-07 SDOH — ECONOMIC STABILITY: FOOD INSECURITY: WITHIN THE PAST 12 MONTHS, THE FOOD YOU BOUGHT JUST DIDN'T LAST AND YOU DIDN'T HAVE MONEY TO GET MORE.: NEVER TRUE

## 2024-08-07 SDOH — ECONOMIC STABILITY: FOOD INSECURITY: WITHIN THE PAST 12 MONTHS, YOU WORRIED THAT YOUR FOOD WOULD RUN OUT BEFORE YOU GOT MONEY TO BUY MORE.: NEVER TRUE

## 2024-08-07 SDOH — ECONOMIC STABILITY: INCOME INSECURITY: HOW HARD IS IT FOR YOU TO PAY FOR THE VERY BASICS LIKE FOOD, HOUSING, MEDICAL CARE, AND HEATING?: NOT HARD AT ALL

## 2024-08-07 NOTE — PROGRESS NOTES
Dulce Limon (:  1955) is a 69 y.o. female,Established patient, here for evaluation of the following chief complaint(s):  Ankle Pain (Still having some pain.) and Hyperlipidemia      Assessment & Plan   1. Acute right ankle pain  2. BMI 31.0-31.9,adult      Return in about 6 months (around 2025) for AWV.       Subjective   HPI    Right heel pain. Achy.  Sharp pain last night (first time). Warm to the touch, red  A couple months   no known trauma.  Did wake her last night.   Tylenol and ice did help.  Voltaren gel does help.  No numbness.   Normally sleep well.  3 large glasses a day of water,   Occasional fried foods.     Would like some eduection on fiber.       Review of Systems   Musculoskeletal:         Right heel pain   Neurological:  Negative for numbness.          Objective   Physical Exam  Constitutional:       Appearance: Normal appearance.   Cardiovascular:      Rate and Rhythm: Normal rate and regular rhythm.      Pulses: Normal pulses.      Heart sounds: Normal heart sounds.   Pulmonary:      Effort: Pulmonary effort is normal.      Breath sounds: Normal breath sounds.   Musculoskeletal:        Feet:    Feet:      Right foot:      Skin integrity: Erythema and warmth present.   Skin:     General: Skin is warm and dry.   Neurological:      General: No focal deficit present.      Mental Status: She is alert and oriented to person, place, and time.                  An electronic signature was used to authenticate this note.    --Jed Jara   
on.    Patient would like to lose weight.  She wonders about the shot.  Insurance does not cover.  She is not able to afford cash.  She is interested in dietary management.    Review of Systems   No fever/chills.     Objective   Physical Exam   Gen: NAD, AAO x 3, coherent, pleasant     Right posterior ankle with mildly tender swelling near insertion of achilles. Mildly painful ROM but not inhibiting.     Lab Results   Component Value Date/Time     07/18/2024 11:58 AM    K 4.3 07/18/2024 11:58 AM     07/18/2024 11:58 AM    CO2 27 07/18/2024 11:58 AM    BUN 17 07/18/2024 11:58 AM    CREATININE 0.9 07/18/2024 11:58 AM    GLUCOSE 101 07/18/2024 11:58 AM    GLUCOSE 104 05/14/2016 06:30 AM    CALCIUM 9.3 07/18/2024 11:58 AM    LABGLOM 69 07/18/2024 11:58 AM    LABGLOM 55 05/22/2023 12:00 AM      Lab Results   Component Value Date    CHOL 156 07/18/2024    CHOL 170 10/07/2023    CHOL 155 05/22/2023     Lab Results   Component Value Date    TRIG 127 07/18/2024    TRIG 114 10/07/2023    TRIG 128 05/22/2023     Lab Results   Component Value Date    HDL 47 07/18/2024    HDL 58 10/07/2023    HDL 56 05/22/2023     Lab Results   Component Value Date    LDL 84 07/18/2024    LDL 89 10/07/2023    LDL 73 05/22/2023     Lab Results   Component Value Date    VLDL 23 10/07/2023    VLDL 26 05/22/2023    VLDL 22 05/14/2022     Lab Results   Component Value Date    CHOLHDLRATIO 1.3 05/22/2023    CHOLHDLRATIO 1.5 05/14/2022    CHOLHDLRATIO 1.8 10/09/2021       Wt Readings from Last 3 Encounters:   08/07/24 89.8 kg (198 lb)   07/24/24 90.3 kg (199 lb)   07/12/24 89.6 kg (197 lb 9.6 oz)     this office note may have been at least partially dictated. Effort was made to review for errors but some may have been missed. Please contact author of note for clarification if needed.     An electronic signature was used to authenticate this note.    --Nurys Salguero MD

## 2024-08-07 NOTE — PATIENT INSTRUCTIONS
To remove extra sugar and reduce amount of processed foods, consider the following:    -- Keep added sugars limit to <25 gram per day    -- For items with grains, aim for Carb: fiber ratio 10: 1+.  For example, 47 grams of carb: 4.7 is minimum and 6 grams fiber is great.     -- More fiber (aiming to 28 gram a day)  and moderate healthy fats to replace less desirable carbs or processed food options.    -- increase fiber slowly for your gut to adjust.  Vegetables, legumes, nuts, seeds, fruits, whole grains.  Fiber helps with weight maintenance, gut health, reduce inflammation, lower cholesterol, etc.        For increasing fiber in your diet, check out    22 High fiber foods that you should eat    https://www.healthline.com/nutrition/22-high-fiber-foods#types-of-fiber        Copied from [email or web address]/healthy-eating-plate/  On 8/2/2020

## 2024-08-08 RX ORDER — NAPROXEN 500 MG/1
500 TABLET ORAL 2 TIMES DAILY WITH MEALS
Qty: 28 TABLET | Refills: 0 | Status: SHIPPED | OUTPATIENT
Start: 2024-08-08 | End: 2024-08-22

## 2024-10-29 ENCOUNTER — HOSPITAL ENCOUNTER (OUTPATIENT)
Dept: MAMMOGRAPHY | Age: 69
Discharge: HOME | End: 2024-10-29
Payer: MEDICARE

## 2024-10-29 VITALS — WEIGHT: 195 LBS | HEIGHT: 67 IN | BODY MASS INDEX: 30.61 KG/M2

## 2024-10-29 DIAGNOSIS — Z12.39 BREAST SCREENING: ICD-10-CM

## 2024-10-29 PROCEDURE — 77063 BREAST TOMOSYNTHESIS BI: CPT

## 2024-10-30 ENCOUNTER — OFFICE VISIT (OUTPATIENT)
Dept: FAMILY MEDICINE CLINIC | Age: 69
End: 2024-10-30

## 2024-10-30 ENCOUNTER — HOSPITAL ENCOUNTER (OUTPATIENT)
Age: 69
Discharge: HOME | End: 2024-10-30
Payer: MEDICARE

## 2024-10-30 VITALS
HEART RATE: 68 BPM | SYSTOLIC BLOOD PRESSURE: 126 MMHG | WEIGHT: 196 LBS | BODY MASS INDEX: 30.76 KG/M2 | RESPIRATION RATE: 18 BRPM | OXYGEN SATURATION: 96 % | DIASTOLIC BLOOD PRESSURE: 72 MMHG | HEIGHT: 67 IN

## 2024-10-30 DIAGNOSIS — N30.00 ACUTE CYSTITIS WITHOUT HEMATURIA: Primary | ICD-10-CM

## 2024-10-30 LAB
BACTERIA URNS QL MICRO: ABNORMAL /HPF
BILIRUB UR QL STRIP.AUTO: NEGATIVE
CASTS #/AREA URNS LPF: ABNORMAL /LPF
CASTS 2: ABNORMAL /LPF
CHARACTER UR: ABNORMAL
COLOR, UA: YELLOW
CRYSTALS URNS MICRO: ABNORMAL
EPITHELIAL CELLS, UA: ABNORMAL /HPF
GLUCOSE UR QL STRIP.AUTO: NEGATIVE MG/DL
HGB UR QL STRIP.AUTO: NEGATIVE
KETONES UR QL STRIP.AUTO: NEGATIVE
MISCELLANEOUS 2: ABNORMAL
NITRITE UR QL STRIP: POSITIVE
PH UR STRIP.AUTO: 7 [PH] (ref 5–9)
PROT UR STRIP.AUTO-MCNC: NEGATIVE MG/DL
RBC URINE: ABNORMAL /HPF
RENAL EPI CELLS #/AREA URNS HPF: ABNORMAL /[HPF]
SP GR UR REFRACT.AUTO: 1.01 (ref 1–1.03)
UROBILINOGEN, URINE: 0.2 EU/DL (ref 0–1)
WBC #/AREA URNS HPF: ABNORMAL /HPF
WBC #/AREA URNS HPF: ABNORMAL /[HPF]
YEAST LIKE FUNGI URNS QL MICRO: ABNORMAL

## 2024-10-30 PROCEDURE — 87077 CULTURE AEROBIC IDENTIFY: CPT

## 2024-10-30 PROCEDURE — 87186 SC STD MICRODIL/AGAR DIL: CPT

## 2024-10-30 PROCEDURE — 87086 URINE CULTURE/COLONY COUNT: CPT

## 2024-10-30 PROCEDURE — 81001 URINALYSIS AUTO W/SCOPE: CPT

## 2024-10-30 RX ORDER — CIPROFLOXACIN 500 MG/1
500 TABLET, FILM COATED ORAL 2 TIMES DAILY
Qty: 10 TABLET | Refills: 0 | Status: SHIPPED | OUTPATIENT
Start: 2024-10-30 | End: 2024-11-04

## 2024-10-30 ASSESSMENT — ENCOUNTER SYMPTOMS
VOMITING: 0
ABDOMINAL PAIN: 0
SHORTNESS OF BREATH: 0
SORE THROAT: 0
COUGH: 0
CHEST TIGHTNESS: 0
EYE PAIN: 0
NAUSEA: 0

## 2024-10-30 NOTE — PROGRESS NOTES
materials - see patient instructions.  Discussed use, benefit, and side effects of prescribed medications.  All patient questions answered.  Pt voiced understanding.  Reviewed health maintenance.       Electronically signed by DARREL Golden CNP on 10/30/2024 at 12:14 PM EDT

## 2024-11-02 LAB
BACTERIA UR CULT: ABNORMAL
BACTERIA UR CULT: ABNORMAL
ORGANISM: ABNORMAL
ORGANISM: ABNORMAL

## 2024-11-12 ENCOUNTER — HOSPITAL ENCOUNTER (EMERGENCY)
Age: 69
Discharge: HOME | End: 2024-11-12
Payer: MEDICARE

## 2024-11-12 ENCOUNTER — APPOINTMENT (OUTPATIENT)
Dept: GENERAL RADIOLOGY | Age: 69
End: 2024-11-12
Payer: MEDICARE

## 2024-11-12 VITALS
HEART RATE: 89 BPM | WEIGHT: 190 LBS | HEIGHT: 67 IN | SYSTOLIC BLOOD PRESSURE: 189 MMHG | TEMPERATURE: 97.3 F | OXYGEN SATURATION: 98 % | BODY MASS INDEX: 29.82 KG/M2 | DIASTOLIC BLOOD PRESSURE: 84 MMHG | RESPIRATION RATE: 20 BRPM

## 2024-11-12 DIAGNOSIS — S61.219A LACERATION OF FINGER OF LEFT HAND WITHOUT FOREIGN BODY WITHOUT DAMAGE TO NAIL, UNSPECIFIED FINGER, INITIAL ENCOUNTER: Primary | ICD-10-CM

## 2024-11-12 PROCEDURE — 6370000000 HC RX 637 (ALT 250 FOR IP): Performed by: NURSE PRACTITIONER

## 2024-11-12 PROCEDURE — 99203 OFFICE O/P NEW LOW 30 MIN: CPT | Performed by: NURSE PRACTITIONER

## 2024-11-12 PROCEDURE — 73130 X-RAY EXAM OF HAND: CPT

## 2024-11-12 PROCEDURE — 12002 RPR S/N/AX/GEN/TRNK2.6-7.5CM: CPT | Performed by: NURSE PRACTITIONER

## 2024-11-12 RX ORDER — LIDOCAINE HYDROCHLORIDE 20 MG/ML
10 INJECTION, SOLUTION INFILTRATION; PERINEURAL ONCE
Status: DISCONTINUED | OUTPATIENT
Start: 2024-11-12 | End: 2024-11-12 | Stop reason: HOSPADM

## 2024-11-12 RX ORDER — BACITRACIN ZINC 500 [USP'U]/G
OINTMENT TOPICAL ONCE
Status: COMPLETED | OUTPATIENT
Start: 2024-11-12 | End: 2024-11-12

## 2024-11-12 RX ORDER — HYDROCODONE BITARTRATE AND ACETAMINOPHEN 5; 325 MG/1; MG/1
1 TABLET ORAL ONCE
Status: COMPLETED | OUTPATIENT
Start: 2024-11-12 | End: 2024-11-12

## 2024-11-12 RX ORDER — CEPHALEXIN 500 MG/1
500 CAPSULE ORAL 2 TIMES DAILY
Qty: 14 CAPSULE | Refills: 0 | Status: SHIPPED | OUTPATIENT
Start: 2024-11-12 | End: 2024-11-19

## 2024-11-12 RX ADMIN — CEPHALEXIN 500 MG: 250 CAPSULE ORAL at 17:45

## 2024-11-12 RX ADMIN — BACITRACIN ZINC: 500 OINTMENT TOPICAL at 17:45

## 2024-11-12 RX ADMIN — HYDROCODONE BITARTRATE AND ACETAMINOPHEN 1 TABLET: 5; 325 TABLET ORAL at 16:35

## 2024-11-12 ASSESSMENT — PAIN DESCRIPTION - ORIENTATION: ORIENTATION: LEFT

## 2024-11-12 ASSESSMENT — PAIN DESCRIPTION - DESCRIPTORS: DESCRIPTORS: ACHING

## 2024-11-12 ASSESSMENT — PAIN DESCRIPTION - LOCATION: LOCATION: HAND;FINGER (COMMENT WHICH ONE)

## 2024-11-12 ASSESSMENT — PAIN DESCRIPTION - FREQUENCY: FREQUENCY: CONTINUOUS

## 2024-11-12 ASSESSMENT — PAIN SCALES - GENERAL
PAINLEVEL_OUTOF10: 4
PAINLEVEL_OUTOF10: 6

## 2024-11-12 ASSESSMENT — PAIN DESCRIPTION - PAIN TYPE: TYPE: ACUTE PAIN

## 2024-11-12 ASSESSMENT — PAIN - FUNCTIONAL ASSESSMENT
PAIN_FUNCTIONAL_ASSESSMENT: PREVENTS OR INTERFERES SOME ACTIVE ACTIVITIES AND ADLS
PAIN_FUNCTIONAL_ASSESSMENT: 0-10

## 2024-11-12 ASSESSMENT — PAIN DESCRIPTION - ONSET: ONSET: SUDDEN

## 2024-11-12 NOTE — DISCHARGE INSTRUCTIONS
Wash the wound at least twice daily with Hibiclens  player.    Use triple antibiotic ointment 2-3 times daily.    Sutures removed in 7 to 10 days.    Complete your full course of oral antibiotics.   Biopsy Type: H and E

## 2024-11-12 NOTE — ED NOTES
Wounds covered with bacitracin and telfa.  Wrapped with 2\" conform gauze and tolerated well.  Pt and spouse given discharge instructions and verbalizes understanding.      Jluis Osman RN  11/12/24 [id number]

## 2024-11-14 ENCOUNTER — OFFICE VISIT (OUTPATIENT)
Dept: FAMILY MEDICINE CLINIC | Age: 69
End: 2024-11-14

## 2024-11-14 VITALS
BODY MASS INDEX: 30.57 KG/M2 | OXYGEN SATURATION: 98 % | RESPIRATION RATE: 18 BRPM | HEART RATE: 66 BPM | SYSTOLIC BLOOD PRESSURE: 138 MMHG | WEIGHT: 195.2 LBS | DIASTOLIC BLOOD PRESSURE: 84 MMHG

## 2024-11-14 DIAGNOSIS — M25.572 CHRONIC PAIN OF LEFT ANKLE: ICD-10-CM

## 2024-11-14 DIAGNOSIS — S61.412A LACERATION OF LEFT HAND WITHOUT FOREIGN BODY, INITIAL ENCOUNTER: Primary | ICD-10-CM

## 2024-11-14 DIAGNOSIS — G89.29 CHRONIC PAIN OF LEFT ANKLE: ICD-10-CM

## 2024-11-14 NOTE — PROGRESS NOTES
office note may have been at least partially dictated. Effort was made to review for errors but some may have been missed. Please contact author of note for clarification if needed.     An electronic signature was used to authenticate this note.    --Nurys Salguero MD

## 2024-11-22 ENCOUNTER — NURSE ONLY (OUTPATIENT)
Dept: FAMILY MEDICINE CLINIC | Age: 69
End: 2024-11-22

## 2024-11-22 NOTE — PROGRESS NOTES
Patient was in for suture removal out of left hand. Dr. Salguero seen sutures and verbally gave the OK to go ahead and remove suture.     Removed Sutures from patients left hand and fingers with no problems or concerns.

## 2025-02-07 ENCOUNTER — OFFICE VISIT (OUTPATIENT)
Dept: FAMILY MEDICINE CLINIC | Age: 70
End: 2025-02-07

## 2025-02-07 VITALS
SYSTOLIC BLOOD PRESSURE: 136 MMHG | BODY MASS INDEX: 29.98 KG/M2 | HEIGHT: 67 IN | HEART RATE: 63 BPM | OXYGEN SATURATION: 96 % | WEIGHT: 191 LBS | RESPIRATION RATE: 18 BRPM | DIASTOLIC BLOOD PRESSURE: 78 MMHG

## 2025-02-07 DIAGNOSIS — Z00.00 MEDICARE ANNUAL WELLNESS VISIT, SUBSEQUENT: Primary | ICD-10-CM

## 2025-02-07 DIAGNOSIS — N39.0 RECURRENT URINARY TRACT INFECTION: ICD-10-CM

## 2025-02-07 DIAGNOSIS — M77.9 ENTHESITIS: ICD-10-CM

## 2025-02-07 DIAGNOSIS — K21.9 GASTROESOPHAGEAL REFLUX DISEASE WITHOUT ESOPHAGITIS: ICD-10-CM

## 2025-02-07 DIAGNOSIS — M76.61 ACHILLES BURSITIS OF RIGHT LOWER EXTREMITY: ICD-10-CM

## 2025-02-07 DIAGNOSIS — E78.1 HIGH TRIGLYCERIDES: ICD-10-CM

## 2025-02-07 PROBLEM — N18.31 CHRONIC KIDNEY DISEASE, STAGE 3A (HCC): Status: RESOLVED | Noted: 2024-07-12 | Resolved: 2025-02-07

## 2025-02-07 LAB
BILIRUBIN, POC: NEGATIVE
BLOOD URINE, POC: NEGATIVE
CLARITY, POC: NORMAL
COLOR, POC: YELLOW
GLUCOSE URINE, POC: NEGATIVE MG/DL
KETONES, POC: NEGATIVE MG/DL
LEUKOCYTE EST, POC: NORMAL
NITRITE, POC: POSITIVE
PH, POC: 7
PROTEIN, POC: NEGATIVE MG/DL
SPECIFIC GRAVITY, POC: 1.02
UROBILINOGEN, POC: 0.2 MG/DL

## 2025-02-07 RX ORDER — CEPHALEXIN 500 MG/1
500 CAPSULE ORAL DAILY
Qty: 14 CAPSULE | Refills: 0 | Status: SHIPPED | OUTPATIENT
Start: 2025-02-14 | End: 2025-02-28

## 2025-02-07 RX ORDER — CEPHALEXIN 500 MG/1
500 CAPSULE ORAL 2 TIMES DAILY
Qty: 14 CAPSULE | Refills: 0 | Status: SHIPPED | OUTPATIENT
Start: 2025-02-07 | End: 2025-02-14

## 2025-02-07 RX ORDER — FENOFIBRATE 145 MG/1
TABLET, COATED ORAL
Qty: 90 TABLET | Refills: 3 | Status: SHIPPED | OUTPATIENT
Start: 2025-02-07

## 2025-02-07 RX ORDER — PANTOPRAZOLE SODIUM 40 MG/1
TABLET, DELAYED RELEASE ORAL
Qty: 90 TABLET | Refills: 3 | Status: SHIPPED | OUTPATIENT
Start: 2025-02-07

## 2025-02-07 RX ORDER — METHENAMINE HIPPURATE 1000 MG/1
1 TABLET ORAL 2 TIMES DAILY WITH MEALS
Qty: 180 TABLET | Refills: 3 | Status: SHIPPED | OUTPATIENT
Start: 2025-02-07 | End: 2026-02-07

## 2025-02-07 SDOH — ECONOMIC STABILITY: FOOD INSECURITY: WITHIN THE PAST 12 MONTHS, YOU WORRIED THAT YOUR FOOD WOULD RUN OUT BEFORE YOU GOT MONEY TO BUY MORE.: NEVER TRUE

## 2025-02-07 SDOH — ECONOMIC STABILITY: FOOD INSECURITY: WITHIN THE PAST 12 MONTHS, THE FOOD YOU BOUGHT JUST DIDN'T LAST AND YOU DIDN'T HAVE MONEY TO GET MORE.: NEVER TRUE

## 2025-02-07 ASSESSMENT — PATIENT HEALTH QUESTIONNAIRE - PHQ9
SUM OF ALL RESPONSES TO PHQ QUESTIONS 1-9: 0
1. LITTLE INTEREST OR PLEASURE IN DOING THINGS: NOT AT ALL
SUM OF ALL RESPONSES TO PHQ QUESTIONS 1-9: 0
SUM OF ALL RESPONSES TO PHQ9 QUESTIONS 1 & 2: 0
SUM OF ALL RESPONSES TO PHQ QUESTIONS 1-9: 0
2. FEELING DOWN, DEPRESSED OR HOPELESS: NOT AT ALL
SUM OF ALL RESPONSES TO PHQ QUESTIONS 1-9: 0

## 2025-02-07 ASSESSMENT — LIFESTYLE VARIABLES
HOW OFTEN DO YOU HAVE A DRINK CONTAINING ALCOHOL: NEVER
HOW MANY STANDARD DRINKS CONTAINING ALCOHOL DO YOU HAVE ON A TYPICAL DAY: PATIENT DOES NOT DRINK

## 2025-02-07 NOTE — PATIENT INSTRUCTIONS
such as diabetes, high blood pressure, and high cholesterol. If you think you may have a problem with alcohol or drug use, talk to your doctor.   Medicines    Take your medicines exactly as prescribed. Call your doctor if you think you are having a problem with your medicine.     If your doctor recommends aspirin, take the amount directed each day. Make sure you take aspirin and not another kind of pain reliever, such as acetaminophen (Tylenol).   When should you call for help?   Call 911 if you have symptoms of a heart attack. These may include:    Chest pain or pressure, or a strange feeling in the chest.     Sweating.     Shortness of breath.     Pain, pressure, or a strange feeling in the back, neck, jaw, or upper belly or in one or both shoulders or arms.     Lightheadedness or sudden weakness.     A fast or irregular heartbeat.   After you call 911, the  may tell you to chew 1 adult-strength or 2 to 4 low-dose aspirin. Wait for an ambulance. Do not try to drive yourself.  Watch closely for changes in your health, and be sure to contact your doctor if you have any problems.  Where can you learn more?  Go to https://www.[employer]fm.net/patientEd and enter F075 to learn more about \"A Healthy Heart: Care Instructions.\"  Current as of: July 31, 2024  Content Version: 14.3  © 2024 [employer].   Care instructions adapted under license by [employer]. If you have questions about a medical condition or this instruction, always ask your healthcare professional. [employer], disclaims any warranty or liability for your use of this information.    Personalized Preventive Plan for Dulce VAILA Oney - 2/7/2025  Medicare offers a range of preventive health benefits. Some of the tests and screenings are paid in full while other may be subject to a deductible, co-insurance, and/or copay.  Some of these benefits include a comprehensive review of your medical history including lifestyle, illnesses that

## 2025-02-07 NOTE — PROGRESS NOTES
Medicare Annual Wellness Visit    Dulce Limon is here for Medicare AWV    Medicare annual wellness visit, subsequent  Recurrent urinary tract infection  -     methenamine (HIPREX) 1 g tablet; Take 1 tablet by mouth 2 times daily (with meals), Disp-180 tablet, R-3Normal  -     POCT Urinalysis no Micro  -     Culture, Urine  -     cephALEXin (KEFLEX) 500 MG capsule; Take 1 capsule by mouth daily for 14 days, Disp-14 capsule, R-0Normal  -     cephALEXin (KEFLEX) 500 MG capsule; Take 1 capsule by mouth 2 times daily for 7 days, Disp-14 capsule, R-0Normal  Gastroesophageal reflux disease without esophagitis  -     pantoprazole (PROTONIX) 40 MG tablet; TAKE 1 TABLET DAILY, Disp-90 tablet, R-3Normal  -     Comprehensive Metabolic Panel; Future  -     CBC with Auto Differential; Future  -     Basic Metabolic Panel; Future  -     Lipid Panel; Future  -     Magnesium; Future  -     TSH reflex to FT4; Future  High triglycerides  -     fenofibrate (TRICOR) 145 MG tablet; TAKE 1 TABLET DAILY, Disp-90 tablet, R-3Normal  -     Comprehensive Metabolic Panel; Future  -     CBC with Auto Differential; Future  -     Basic Metabolic Panel; Future  -     Lipid Panel; Future  -     Magnesium; Future  -     TSH reflex to FT4; Future  Achilles bursitis of right lower extremity  -     [facility] - Jluis Hare DPM, [facility]  Enthesitis  -     Jluis Coffman DPM, Podiatry, [place]    Concern for UtI -- treat above for 1 week, will follow culture, then treat daily for another week for suppression.   Discussion with urology again may be helpful.   Referral for foot/ankle issues.      Return in about 6 months (around 8/7/2025).     Subjective   Chief Complaint   Patient presents with    Medicare AWV      History of Present Illness  The patient is a 69-year-old female who presents for an annual wellness visit. She reports overall good health but expresses concern about a potential urinary tract infection (UTI).    She has a history of recurrent

## 2025-02-09 LAB
BACTERIA UR CULT: ABNORMAL
ORGANISM: ABNORMAL

## 2025-02-11 DIAGNOSIS — N39.0 RECURRENT URINARY TRACT INFECTION: ICD-10-CM

## 2025-02-11 RX ORDER — CEPHALEXIN 500 MG/1
500 CAPSULE ORAL DAILY
Qty: 90 CAPSULE | Refills: 0 | Status: SHIPPED | OUTPATIENT
Start: 2025-02-14 | End: 2025-05-15

## 2025-02-14 ENCOUNTER — TELEPHONE (OUTPATIENT)
Dept: FAMILY MEDICINE CLINIC | Age: 70
End: 2025-02-14

## 2025-02-14 NOTE — TELEPHONE ENCOUNTER
Called patient and left her a voicemail [facility] as well as give us a call back on Monday.

## 2025-02-14 NOTE — TELEPHONE ENCOUNTER
----- Message from Dr. Nurys Salguero MD sent at 2/14/2025  1:52 PM EST -----  Please follow up with patient.  I sent a noted on 2/11 but she has not viewed it.   Thank you.

## 2025-03-06 ENCOUNTER — HOSPITAL ENCOUNTER (OUTPATIENT)
Age: 70
Discharge: HOME | End: 2025-03-06
Payer: MEDICARE

## 2025-03-06 DIAGNOSIS — E78.1 HIGH TRIGLYCERIDES: ICD-10-CM

## 2025-03-06 DIAGNOSIS — K21.9 GASTROESOPHAGEAL REFLUX DISEASE WITHOUT ESOPHAGITIS: ICD-10-CM

## 2025-03-06 PROCEDURE — 82306 VITAMIN D 25 HYDROXY: CPT

## 2025-03-06 PROCEDURE — 86140 C-REACTIVE PROTEIN: CPT

## 2025-03-06 PROCEDURE — 82180 ASSAY OF ASCORBIC ACID: CPT

## 2025-03-06 PROCEDURE — 36415 COLL VENOUS BLD VENIPUNCTURE: CPT

## 2025-03-06 PROCEDURE — 80061 LIPID PANEL: CPT

## 2025-03-06 PROCEDURE — 85651 RBC SED RATE NONAUTOMATED: CPT

## 2025-03-06 PROCEDURE — 80053 COMPREHEN METABOLIC PANEL: CPT

## 2025-03-06 PROCEDURE — 83735 ASSAY OF MAGNESIUM: CPT

## 2025-03-06 PROCEDURE — 84134 ASSAY OF PREALBUMIN: CPT

## 2025-03-06 PROCEDURE — 84443 ASSAY THYROID STIM HORMONE: CPT

## 2025-03-06 PROCEDURE — 85025 COMPLETE CBC W/AUTO DIFF WBC: CPT

## 2025-03-07 LAB
25(OH)D3 SERPL-MCNC: 39 NG/ML (ref 30–100)
ALBUMIN SERPL BCG-MCNC: 4.4 G/DL (ref 3.4–4.9)
ALP SERPL-CCNC: 49 U/L (ref 35–104)
ALT SERPL W/O P-5'-P-CCNC: 18 U/L (ref 10–35)
ANION GAP SERPL CALC-SCNC: 9 MEQ/L (ref 8–16)
AST SERPL-CCNC: 16 U/L (ref 10–35)
BASOPHILS ABSOLUTE: 0 THOU/MM3 (ref 0–0.1)
BASOPHILS NFR BLD AUTO: 0.4 %
BILIRUB SERPL-MCNC: 0.4 MG/DL (ref 0.3–1.2)
BUN SERPL-MCNC: 19 MG/DL (ref 8–23)
CALCIUM SERPL-MCNC: 9.9 MG/DL (ref 8.8–10.2)
CHLORIDE SERPL-SCNC: 101 MEQ/L (ref 98–111)
CHOLEST SERPL-MCNC: 166 MG/DL (ref 100–199)
CO2 SERPL-SCNC: 29 MEQ/L (ref 22–29)
CREAT SERPL-MCNC: 0.9 MG/DL (ref 0.5–0.9)
CRP SERPL-MCNC: < 0.3 MG/DL (ref 0–0.5)
DEPRECATED RDW RBC AUTO: 44.1 FL (ref 35–45)
EOSINOPHIL NFR BLD AUTO: 0.5 %
EOSINOPHILS ABSOLUTE: 0 THOU/MM3 (ref 0–0.4)
ERYTHROCYTE [DISTWIDTH] IN BLOOD BY AUTOMATED COUNT: 12.5 % (ref 11.5–14.5)
ERYTHROCYTE [SEDIMENTATION RATE] IN BLOOD BY WESTERGREN METHOD: 4 MM/HR (ref 0–20)
GFR SERPL CREATININE-BSD FRML MDRD: 68 ML/MIN/1.73M2
GLUCOSE SERPL-MCNC: 86 MG/DL (ref 74–109)
HCT VFR BLD AUTO: 42.5 % (ref 37–47)
HDLC SERPL-MCNC: 52 MG/DL
HGB BLD-MCNC: 13.7 GM/DL (ref 12–16)
IMM GRANULOCYTES # BLD AUTO: 0.03 THOU/MM3 (ref 0–0.07)
IMM GRANULOCYTES NFR BLD AUTO: 0.4 %
LDLC SERPL CALC-MCNC: 93 MG/DL
LYMPHOCYTES ABSOLUTE: 2.3 THOU/MM3 (ref 1–4.8)
LYMPHOCYTES NFR BLD AUTO: 28.3 %
MAGNESIUM SERPL-MCNC: 2.1 MG/DL (ref 1.6–2.6)
MCH RBC QN AUTO: 31.2 PG (ref 26–33)
MCHC RBC AUTO-ENTMCNC: 32.2 GM/DL (ref 32.2–35.5)
MCV RBC AUTO: 96.8 FL (ref 81–99)
MONOCYTES ABSOLUTE: 0.6 THOU/MM3 (ref 0.4–1.3)
MONOCYTES NFR BLD AUTO: 6.8 %
NEUTROPHILS ABSOLUTE: 5.2 THOU/MM3 (ref 1.8–7.7)
NEUTROPHILS NFR BLD AUTO: 63.6 %
NRBC BLD AUTO-RTO: 0 /100 WBC
PLATELET # BLD AUTO: 321 THOU/MM3 (ref 130–400)
PMV BLD AUTO: 11.4 FL (ref 9.4–12.4)
POTASSIUM SERPL-SCNC: 4.6 MEQ/L (ref 3.5–5.2)
PREALB SERPL-MCNC: 26.3 MG/DL (ref 20–40)
PROT SERPL-MCNC: 6.9 G/DL (ref 6.4–8.3)
RBC # BLD AUTO: 4.39 MILL/MM3 (ref 4.2–5.4)
SODIUM SERPL-SCNC: 139 MEQ/L (ref 135–145)
TRIGL SERPL-MCNC: 104 MG/DL (ref 0–199)
TSH SERPL DL<=0.05 MIU/L-ACNC: 1.5 UIU/ML (ref 0.27–4.2)
WBC # BLD AUTO: 8.1 THOU/MM3 (ref 4.8–10.8)

## 2025-03-11 LAB — VIT C SERPL-MCNC: NORMAL MG/DL

## 2025-03-13 ENCOUNTER — RESULTS FOLLOW-UP (OUTPATIENT)
Dept: FAMILY MEDICINE CLINIC | Age: 70
End: 2025-03-13

## 2025-04-14 ENCOUNTER — HOSPITAL ENCOUNTER (OUTPATIENT)
Dept: GENERAL RADIOLOGY | Age: 70
Discharge: HOME | End: 2025-04-14
Payer: MEDICARE

## 2025-04-14 ENCOUNTER — TRANSCRIBE ORDERS (OUTPATIENT)
Dept: URGENT CARE | Age: 70
End: 2025-04-14

## 2025-04-14 DIAGNOSIS — B35.1 TINEA UNGUIUM: Primary | ICD-10-CM

## 2025-04-14 LAB
ALT SERPL W/O P-5'-P-CCNC: 17 U/L (ref 10–35)
AST SERPL-CCNC: 20 U/L (ref 10–35)

## 2025-04-14 PROCEDURE — 84460 ALANINE AMINO (ALT) (SGPT): CPT

## 2025-04-14 PROCEDURE — 36415 COLL VENOUS BLD VENIPUNCTURE: CPT

## 2025-04-14 PROCEDURE — 84450 TRANSFERASE (AST) (SGOT): CPT

## 2025-07-22 DIAGNOSIS — N39.0 RECURRENT URINARY TRACT INFECTION: ICD-10-CM

## 2025-07-22 RX ORDER — METHENAMINE HIPPURATE 1000 MG/1
1 TABLET ORAL 2 TIMES DAILY WITH MEALS
Qty: 180 TABLET | Refills: 3 | Status: SHIPPED | OUTPATIENT
Start: 2025-07-22

## 2025-07-22 NOTE — TELEPHONE ENCOUNTER
Dulce Limon called requesting a refill on the following medications:  Requested Prescriptions     Pending Prescriptions Disp Refills    methenamine (HIPREX) 1 g tablet [Pharmacy Med Name: METHENAM HIP TAB 1GM] 180 tablet 3     Sig: TAKE 1 TABLET TWICE DAILY  WITH MEALS       Date of last visit: 2/7/2025  Date of next visit (if applicable):Visit date not found  Date of last refill: 02/07/2025  Pharmacy Name: [facility]       Thanks,  Ronni Shirley LPN

## 2025-08-22 ENCOUNTER — OFFICE VISIT (OUTPATIENT)
Dept: FAMILY MEDICINE CLINIC | Age: 70
End: 2025-08-22

## 2025-08-22 VITALS
HEART RATE: 65 BPM | DIASTOLIC BLOOD PRESSURE: 80 MMHG | OXYGEN SATURATION: 95 % | BODY MASS INDEX: 31.45 KG/M2 | WEIGHT: 200.8 LBS | SYSTOLIC BLOOD PRESSURE: 134 MMHG

## 2025-08-22 DIAGNOSIS — R35.0 FREQUENT URINATION: Primary | ICD-10-CM

## 2025-08-22 DIAGNOSIS — M25.572 CHRONIC PAIN OF LEFT ANKLE: ICD-10-CM

## 2025-08-22 DIAGNOSIS — E78.1 HIGH TRIGLYCERIDES: ICD-10-CM

## 2025-08-22 DIAGNOSIS — R73.03 PREDIABETES: ICD-10-CM

## 2025-08-22 DIAGNOSIS — Z12.11 SCREEN FOR COLON CANCER: ICD-10-CM

## 2025-08-22 DIAGNOSIS — N39.0 RECURRENT URINARY TRACT INFECTION: ICD-10-CM

## 2025-08-22 DIAGNOSIS — G89.29 CHRONIC PAIN OF LEFT ANKLE: ICD-10-CM

## 2025-08-22 PROBLEM — R09.82 POST-NASAL DRAINAGE: Status: RESOLVED | Noted: 2021-01-24 | Resolved: 2025-08-22

## 2025-08-22 PROBLEM — M77.8 LEFT WRIST TENDONITIS: Status: RESOLVED | Noted: 2021-01-24 | Resolved: 2025-08-22

## 2025-08-22 LAB
BILIRUBIN, POC: NEGATIVE
BLOOD URINE, POC: NEGATIVE
CLARITY, POC: NORMAL
COLOR, POC: NORMAL
GLUCOSE URINE, POC: NEGATIVE MG/DL
KETONES, POC: NEGATIVE MG/DL
LEUKOCYTE EST, POC: NEGATIVE
NITRITE, POC: NORMAL
PH, POC: 7
PROTEIN, POC: NEGATIVE MG/DL
SPECIFIC GRAVITY, POC: 1.02
UROBILINOGEN, POC: 0.2 MG/DL

## 2025-08-22 RX ORDER — CEPHALEXIN 500 MG/1
500 CAPSULE ORAL 3 TIMES DAILY
Qty: 21 CAPSULE | Refills: 0 | Status: SHIPPED | OUTPATIENT
Start: 2025-08-22 | End: 2025-08-29

## 2025-08-22 RX ORDER — TERBINAFINE HYDROCHLORIDE 250 MG/1
250 TABLET ORAL DAILY
COMMUNITY
Start: 2025-05-22 | End: 2025-08-22

## 2025-08-24 PROBLEM — R00.2 PALPITATIONS: Status: RESOLVED | Noted: 2021-01-24 | Resolved: 2025-08-24

## 2025-08-24 LAB
BACTERIA UR CULT: ABNORMAL
ORGANISM: ABNORMAL

## 2025-08-25 LAB
BACTERIA UR CULT: ABNORMAL
ORGANISM: ABNORMAL